# Patient Record
Sex: MALE | Race: WHITE | NOT HISPANIC OR LATINO | Employment: FULL TIME | URBAN - METROPOLITAN AREA
[De-identification: names, ages, dates, MRNs, and addresses within clinical notes are randomized per-mention and may not be internally consistent; named-entity substitution may affect disease eponyms.]

---

## 2022-03-02 ENCOUNTER — HOSPITAL ENCOUNTER (EMERGENCY)
Facility: HOSPITAL | Age: 37
Discharge: HOME | End: 2022-03-02
Attending: EMERGENCY MEDICINE | Admitting: EMERGENCY MEDICINE

## 2022-03-02 VITALS
RESPIRATION RATE: 16 BRPM | HEIGHT: 66 IN | OXYGEN SATURATION: 100 % | DIASTOLIC BLOOD PRESSURE: 92 MMHG | WEIGHT: 165 LBS | SYSTOLIC BLOOD PRESSURE: 143 MMHG | BODY MASS INDEX: 26.52 KG/M2 | TEMPERATURE: 96.9 F | HEART RATE: 87 BPM

## 2022-03-02 DIAGNOSIS — G43.009 MIGRAINE WITHOUT AURA AND WITHOUT STATUS MIGRAINOSUS, NOT INTRACTABLE: Primary | ICD-10-CM

## 2022-03-02 PROCEDURE — 99282 EMERGENCY DEPT VISIT SF MDM: CPT

## 2022-03-02 PROCEDURE — 25010000002 METHYLPREDNISOLONE PER 125 MG: Performed by: NURSE PRACTITIONER

## 2022-03-02 PROCEDURE — 96375 TX/PRO/DX INJ NEW DRUG ADDON: CPT

## 2022-03-02 PROCEDURE — 25010000002 DROPERIDOL PER 5 MG: Performed by: NURSE PRACTITIONER

## 2022-03-02 PROCEDURE — 25010000002 DIPHENHYDRAMINE PER 50 MG: Performed by: NURSE PRACTITIONER

## 2022-03-02 PROCEDURE — 99283 EMERGENCY DEPT VISIT LOW MDM: CPT

## 2022-03-02 PROCEDURE — 96374 THER/PROPH/DIAG INJ IV PUSH: CPT

## 2022-03-02 RX ORDER — DROPERIDOL 2.5 MG/ML
2.5 INJECTION, SOLUTION INTRAMUSCULAR; INTRAVENOUS ONCE
Status: COMPLETED | OUTPATIENT
Start: 2022-03-02 | End: 2022-03-02

## 2022-03-02 RX ORDER — METHYLPREDNISOLONE SODIUM SUCCINATE 125 MG/2ML
125 INJECTION, POWDER, LYOPHILIZED, FOR SOLUTION INTRAMUSCULAR; INTRAVENOUS ONCE
Status: COMPLETED | OUTPATIENT
Start: 2022-03-02 | End: 2022-03-02

## 2022-03-02 RX ORDER — DIPHENHYDRAMINE HYDROCHLORIDE 50 MG/ML
25 INJECTION INTRAMUSCULAR; INTRAVENOUS ONCE
Status: COMPLETED | OUTPATIENT
Start: 2022-03-02 | End: 2022-03-02

## 2022-03-02 RX ORDER — SODIUM CHLORIDE 0.9 % (FLUSH) 0.9 %
10 SYRINGE (ML) INJECTION AS NEEDED
Status: DISCONTINUED | OUTPATIENT
Start: 2022-03-02 | End: 2022-03-02 | Stop reason: HOSPADM

## 2022-03-02 RX ORDER — SUMATRIPTAN 50 MG/1
50 TABLET, FILM COATED ORAL ONCE
Qty: 18 TABLET | Refills: 0 | Status: SHIPPED | OUTPATIENT
Start: 2022-03-02 | End: 2022-06-28 | Stop reason: SDUPTHER

## 2022-03-02 RX ADMIN — METHYLPREDNISOLONE SODIUM SUCCINATE 125 MG: 125 INJECTION, POWDER, FOR SOLUTION INTRAMUSCULAR; INTRAVENOUS at 13:39

## 2022-03-02 RX ADMIN — DROPERIDOL 2.5 MG: 2.5 INJECTION, SOLUTION INTRAMUSCULAR; INTRAVENOUS at 13:38

## 2022-03-02 RX ADMIN — SODIUM CHLORIDE 1000 ML: 9 INJECTION, SOLUTION INTRAVENOUS at 13:25

## 2022-03-02 RX ADMIN — DIPHENHYDRAMINE HYDROCHLORIDE 25 MG: 50 INJECTION, SOLUTION INTRAMUSCULAR; INTRAVENOUS at 13:37

## 2022-03-02 NOTE — ED NOTES
Pt arrives from home via PV with c/o migraine x 6 days. Hx of migraines. Pt a&ox4, abc's intact, NAD noted, ambulatory to triage.    Patient wearing mask during triage. RN wearing appropriate PPE during triage. Hand hygiene performed.        Joann Hyatt, RN  03/02/22 4695

## 2022-03-02 NOTE — DISCHARGE INSTRUCTIONS
Keep a headache journal    Can try Benadryl with Ibuprofen or Tylenol at onset of headache    Return Precautions    Although you are being discharged from the ED today, I encourage you to return for worsening symptoms.  Things can, and do, change such that treatment at home with medication may not be adequate.      Specifically, return for any of the following:    Chest pain, shortness of breath, pain or nausea and vomiting not controlled by medications provided.    Please make a follow up with your Primary Care Provider for a blood pressure recheck.

## 2022-03-02 NOTE — ED PROVIDER NOTES
EMERGENCY DEPARTMENT ENCOUNTER    Room Number:  A01/01  Date seen:  3/2/2022  Time seen: 13:03 EST  PCP: Karson Solorzano Jr., JUDAH  Historian: Patient    HPI:  Chief complaint: Migraine headache  A complete HPI/ROS/PMH/PSH/SH/FH are unobtainable due to: n/a  Context:Harinder Dale is a 36 y.o. male who presents to the ED with c/o 6 days of migraine headache that is in its usual location, behind the left eye and left forehead.  It is described as moderate to severe and he has associated n/v (on Saturday) and photophobia.  It is not made better by multiple dose of Excedrin.  It is made worse by an increased stress level.  He has not been prescribed anything in past for migraines but states he has had them for years.  He denies any unilateral weakness, problems speaking or visual changes.     Patient was placed in face mask in first look. Patient was wearing facemask when I entered the room and throughout our encounter. I wore full protective equipment throughout this patient encounter including a N95 face mask, eye shield and gloves. Hand hygiene/washing of hands was performed before donning protective equipment and after removal when leaving the room.    MEDICAL RECORD REVIEW    ALLERGIES  Patient has no known allergies.    PAST MEDICAL HISTORY  Active Ambulatory Problems     Diagnosis Date Noted   • No Active Ambulatory Problems     Resolved Ambulatory Problems     Diagnosis Date Noted   • No Resolved Ambulatory Problems     No Additional Past Medical History       PAST SURGICAL HISTORY  Past Surgical History:   Procedure Laterality Date   • KNEE FUSION Right        FAMILY HISTORY  History reviewed. No pertinent family history.    SOCIAL HISTORY  Social History     Socioeconomic History   • Marital status: Single   Tobacco Use   • Smoking status: Former Smoker     Quit date: 3/2/2016     Years since quittin.0   • Smokeless tobacco: Never Used   Vaping Use   • Vaping Use: Never used   Substance and Sexual  Activity   • Alcohol use: Not Currently       REVIEW OF SYSTEMS  Review of Systems    All systems reviewed and negative except for those discussed in HPI.     PHYSICAL EXAM    ED Triage Vitals [03/02/22 1251]   Temp Heart Rate Resp BP SpO2   96.9 °F (36.1 °C) 105 18 -- 100 %      Temp src Heart Rate Source Patient Position BP Location FiO2 (%)   Tympanic Monitor -- -- --     Physical Exam    I have reviewed the triage vital signs and nursing notes.      GENERAL: Appears as though he does not feel well, tearful  HENT: nares patent, membranes moist  EYES: no scleral icterus, PERRL, EOMI  NECK: no ROM limitations, no rigidity  CV: regular rhythm, regular rate, no murmur, no rubs, no gallups  RESPIRATORY: normal effort, CTAB  ABDOMEN: soft  : deferred  MUSCULOSKELETAL: no deformity  NEURO: alert, moves all extremities, follows commands  SKIN: warm, dry      PROGRESS, DATA ANALYSIS, CONSULTS AND MEDICAL DECISION MAKING  All labs have been independently reviewed by me.  All radiology studies have been reviewed by me and discussed with radiologist dictating the report.  EKG's independently viewed and interpreted by me unless stated otherwise. Discussion below represents my analysis of pertinent findings related to patient's condition, differential diagnosis, treatment plan and final disposition.     ED Course as of 03/02/22 1521   Wed Mar 02, 2022   1321 DDX: headache, migraine headache [EW]   1426 Pt states headache is still present but reports significant improvement.     MDM:  This headache is typical for patient.  He has no red flag neurological findings.  Headache improved with treatment in ER.  I have encouraged patient to keep a headache journal to determine whether or not stress, foods or other factors are contributing to his headaches.  I have discussed with him that he needs to follow this up with his primary care provider.  I discussed to never again take Excedrin as this is a huge rebound agent.  We did  "discuss a short course of Imitrex and he is agreeable to trying this. [EW]      ED Course User Index  [EW] Scarlett Tamayo APRN        Reviewed pt's history and workup with Dr. Lyn.  After a bedside evaluation, Dr. Lyn agrees with the plan of care.    The patient's history, physical exam, and lab findings were discussed with the physician, who also performed a face to face history and physical exam.  I discussed all results and noted any abnormalities with patient.  Discussed absoute need to recheck abnormalities with their family physician.  I answered any of the patient's questions.  Discussed plan for discharge, as there is no emergent indication for admission.  Pt is agreeable and understands need for follow up and repeat testing.  Pt is aware that discharge does not mean that nothing is wrong but it indicates no emergency is present and they must continue care with their family physician.  Pt is discharged with instructions to follow up with primary care doctor to have their blood pressure rechecked.       Disposition vitals:  /92 (BP Location: Left arm, Patient Position: Lying)   Pulse 87   Temp 96.9 °F (36.1 °C) (Tympanic)   Resp 16   Ht 167.6 cm (66\")   Wt 74.8 kg (165 lb)   SpO2 100%   BMI 26.63 kg/m²       DIAGNOSIS  Final diagnoses:   Migraine without aura and without status migrainosus, not intractable       FOLLOW UP   Karson Solorzano Jr., APRN  [address] [facility] [address] [phone]    Schedule an appointment as soon as possible for a visit   to discuss your headaches         Scarlett Tamayo APRN  03/02/22 1521    "

## 2022-05-08 ENCOUNTER — TELEPHONE (OUTPATIENT)
Dept: NEUROLOGY | Facility: CLINIC | Age: 37
End: 2022-05-08

## 2022-06-28 ENCOUNTER — OFFICE VISIT (OUTPATIENT)
Dept: NEUROLOGY | Facility: CLINIC | Age: 37
End: 2022-06-28

## 2022-06-28 VITALS
HEIGHT: 66 IN | HEART RATE: 86 BPM | OXYGEN SATURATION: 98 % | DIASTOLIC BLOOD PRESSURE: 74 MMHG | BODY MASS INDEX: 28.93 KG/M2 | SYSTOLIC BLOOD PRESSURE: 120 MMHG | WEIGHT: 180 LBS

## 2022-06-28 DIAGNOSIS — G43.009 MIGRAINE WITHOUT AURA AND WITHOUT STATUS MIGRAINOSUS, NOT INTRACTABLE: Primary | ICD-10-CM

## 2022-06-28 PROCEDURE — 99204 OFFICE O/P NEW MOD 45 MIN: CPT | Performed by: PSYCHIATRY & NEUROLOGY

## 2022-06-28 RX ORDER — IBUPROFEN 400 MG/1
400 TABLET ORAL
COMMUNITY

## 2022-06-28 RX ORDER — SUMATRIPTAN 50 MG/1
50 TABLET, FILM COATED ORAL ONCE AS NEEDED
Qty: 9 TABLET | Refills: 2 | Status: SHIPPED | OUTPATIENT
Start: 2022-06-28 | End: 2022-09-30

## 2022-06-28 RX ORDER — SUMATRIPTAN 50 MG/1
50 TABLET, FILM COATED ORAL ONCE AS NEEDED
Qty: 9 TABLET | Refills: 2 | Status: SHIPPED | OUTPATIENT
Start: 2022-06-28 | End: 2022-06-28

## 2022-06-28 NOTE — PROGRESS NOTES
Chief Complaint  Migraine    Subjective          Harinder Dale presents to [facility] NEUROLOGY for   HISTORY OF PRESENT ILLNESS:    Harinder Dale is a 37 year old right handed man who presents to neurology clinic for initial evaluation and treatment of migraines.  He has had migraines since he was 4 or 5 years old.  The headaches are mostly in the front of his head with a throbbing quality which he rates as 10/10 on pain scale 1-10 when most severe with associated light and sound sensitivity along with nausea and vomiting.  Headaches can last 12 hours up to 6 days in duration.  He had the longest migraine in 2022 for which he went to the ED and was prescribed sumatriptan 50 mg which he used and this got rid of her migraine.  He continued to get the migraines for about 2 months and he has been migraine free of there last month.  He has completely cut out caffeine and Excedrin migraine.  There is family history of migraines in his sister.  He had a head CT scan done in 3/2015 which I reviewed the images independently on his visit today and looks normal.  He is also taking ibuprofen as needed.  Overall he is doing well at this time.  Stress and gluten tends to be known triggers for him.  Laying down in a cold and dark room tends to help with his migraines.  He denies any history of kidney stones or heart palpitations.  He does not have any auras with his migraines currently.      Past Medical History:   Diagnosis Date   • Difficulty walking    • Migraine         Family History   Problem Relation Age of Onset   • Migraines Sister         Social History     Socioeconomic History   • Marital status: Single   Tobacco Use   • Smoking status: Former Smoker     Quit date: 3/2/2016     Years since quittin.3   • Smokeless tobacco: Never Used   Vaping Use   • Vaping Use: Never used   Substance and Sexual Activity   • Alcohol use: Not Currently        I have reviewed and confirmed the accuracy  "of the ROS as documented by the MA/LPN/RN Dominic Aponte MD    Review of Systems   Constitutional: Negative for activity change, appetite change, fatigue and fever.   HENT: Negative for trouble swallowing and voice change.    Eyes: Negative for blurred vision, double vision and itching.   Gastrointestinal: Negative for nausea and vomiting.   Musculoskeletal: Positive for back pain and gait problem. Negative for neck stiffness.   Allergic/Immunologic: Positive for food allergies (wheat). Negative for environmental allergies.   Neurological: Positive for headache. Negative for dizziness, tremors, seizures, syncope, facial asymmetry, speech difficulty, weakness, light-headedness, numbness, memory problem and confusion.   Psychiatric/Behavioral: Negative for agitation, behavioral problems, decreased concentration, dysphoric mood, hallucinations, self-injury, sleep disturbance, suicidal ideas, negative for hyperactivity, depressed mood and stress. The patient is not nervous/anxious.         Objective   Vital Signs:   /74   Pulse 86   Ht 167.6 cm (65.98\")   Wt 81.6 kg (180 lb)   SpO2 98%   BMI 29.07 kg/m²       PHYSICAL EXAM:    General   Mental Status - Alert. General Appearance - Well developed, Well groomed, Oriented and Cooperative. Orientation - Oriented X3.       Head and Neck  Head - normocephalic, atraumatic with no lesions or palpable masses.  Neck    Global Assessment - supple.       Eye   Sclera/Conjunctiva - Bilateral - Normal.    ENMT  Mouth and Throat   Oral Cavity/Oropharynx: Oropharynx - the soft palate,uvula and tongue are normal in appearance.    Chest and Lung Exam   Chest - lung clear to auscultation bilaterally.    Cardiovascular   Cardiovascular examination reveals  - normal heart sounds, regular rate and rhythm.    Neurologic   Mental Status: Speech - Normal. Cognitive function - appropriate fund of knowledge. No impairment of attention, Impairment of concentration, impairment of long " term memory or impairment of short term memory.  Cranial Nerves:   II Optic: Visual acuity - Left - Normal. Right - Normal. Visual fields - Normal (to confrontation).  III Oculomotor: Pupillary constriction - Left - Normal. Right - Normal.  VII Facial: - Normal Bilaterally.   IX Glossopharyngeal / X Vagus - Normal.  XI Accessory: Trapezius - Bilateral - Normal. Sternocleidomastoid - Bilateral - Normal.  XII Hypoglossal - Bilateral - Normal.  Eye Movements: - Normal Bilaterally.  Sensory:   Light Touch: Intact - Globally.  Motor:   Bulk and Contour: - Normal.  Tone: - Normal.  Tremor: Not present.  Strength: 5/5 normal muscle strength - All Muscles.   General Assessment of Reflexes: - deep tendon reflexes are normal. Coordination - No Impairment of finger-to-nose or Impairment of rapid alternating movements. Gait - Broad based, right knee if fused, uses it as crutch when walking.     Result Review :                 Assessment and Plan    Problem List Items Addressed This Visit        Neuro    Migraine without aura and without status migrainosus, not intractable - Primary    Current Assessment & Plan     37 year old right handed man migraines without aura.  He has had migraines since he was 4 or 5 years old.  The headaches are mostly in the front of his head with a throbbing quality which he rates as 10/10 on pain scale 1-10 when most severe with associated light and sound sensitivity along with nausea and vomiting.  Headaches can last 12 hours up to 6 days in duration.  He had the longest migraine in March 2022 for which he went to the ED and was prescribed sumatriptan 50 mg which he used and this got rid of her migraine.  He continued to get the migraines for about 2 months and he has been migraine free of there last month.  He has completely cut out caffeine and Excedrin migraine.  There is family history of migraines in his sister.  He had a head CT scan done in 3/2015 which I reviewed the images independently on  his visit today and looks normal.  He is also taking ibuprofen as needed.  Overall he is doing well at this time.  Stress and gluten tends to be known triggers for him.  Laying down in a cold and dark room tends to help with his migraines.  He denies any history of kidney stones or heart palpitations.  He does not have any auras with his migraines currently.  Discussed treatment of his migraines, at this time as he is doing well we will not start him on migraine preventative medicine and instead I will prescribe him the sumatriptan which he can use acutely for migraine treatment as needed.  Discussed migraine triggers and lifestyle modifications at length and provided patient education information on his visit today.              Relevant Medications    ibuprofen (ADVIL,MOTRIN) 400 MG tablet    SUMAtriptan (IMITREX) 50 MG tablet          I spent time caring for Harinder on this date of service. This time includes time spent by me in the following activities:preparing for the visit, reviewing tests, obtaining and/or reviewing a separately obtained history, performing a medically appropriate examination and/or evaluation , counseling and educating the patient/family/caregiver, ordering medications, tests, or procedures, documenting information in the medical record, independently interpreting results and communicating that information with the patient/family/caregiver and care coordination    Follow Up   Return in about 3 months (around 9/28/2022).  Patient was given instructions and counseling regarding his condition or for health maintenance advice. Please see specific information pulled into the AVS if appropriate.

## 2022-06-28 NOTE — ASSESSMENT & PLAN NOTE
37 year old right handed man migraines without aura.  He has had migraines since he was 4 or 5 years old.  The headaches are mostly in the front of his head with a throbbing quality which he rates as 10/10 on pain scale 1-10 when most severe with associated light and sound sensitivity along with nausea and vomiting.  Headaches can last 12 hours up to 6 days in duration.  He had the longest migraine in March 2022 for which he went to the ED and was prescribed sumatriptan 50 mg which he used and this got rid of her migraine.  He continued to get the migraines for about 2 months and he has been migraine free of there last month.  He has completely cut out caffeine and Excedrin migraine.  There is family history of migraines in his sister.  He had a head CT scan done in 3/2015 which I reviewed the images independently on his visit today and looks normal.  He is also taking ibuprofen as needed.  Overall he is doing well at this time.  Stress and gluten tends to be known triggers for him.  Laying down in a cold and dark room tends to help with his migraines.  He denies any history of kidney stones or heart palpitations.  He does not have any auras with his migraines currently.  Discussed treatment of his migraines, at this time as he is doing well we will not start him on migraine preventative medicine and instead I will prescribe him the sumatriptan which he can use acutely for migraine treatment as needed.  Discussed migraine triggers and lifestyle modifications at length and provided patient education information on his visit today.

## 2022-07-07 ENCOUNTER — PATIENT ROUNDING (BHMG ONLY) (OUTPATIENT)
Dept: NEUROLOGY | Facility: CLINIC | Age: 37
End: 2022-07-07

## 2022-09-30 ENCOUNTER — OFFICE VISIT (OUTPATIENT)
Dept: NEUROLOGY | Facility: CLINIC | Age: 37
End: 2022-09-30

## 2022-09-30 VITALS
WEIGHT: 173 LBS | OXYGEN SATURATION: 98 % | HEIGHT: 66 IN | BODY MASS INDEX: 27.8 KG/M2 | SYSTOLIC BLOOD PRESSURE: 124 MMHG | DIASTOLIC BLOOD PRESSURE: 72 MMHG | HEART RATE: 73 BPM

## 2022-09-30 DIAGNOSIS — G43.009 MIGRAINE WITHOUT AURA AND WITHOUT STATUS MIGRAINOSUS, NOT INTRACTABLE: Primary | ICD-10-CM

## 2022-09-30 PROCEDURE — 99214 OFFICE O/P EST MOD 30 MIN: CPT | Performed by: PSYCHIATRY & NEUROLOGY

## 2022-09-30 RX ORDER — RIZATRIPTAN BENZOATE 5 MG/1
5 TABLET ORAL ONCE AS NEEDED
Qty: 12 TABLET | Refills: 2 | Status: SHIPPED | OUTPATIENT
Start: 2022-09-30 | End: 2023-01-26

## 2022-09-30 NOTE — PROGRESS NOTES
Chief Complaint  Migraine (Pt here today for follow up on migraines. Pt is taking Imitrex for abortive. Stated his migraines are better since the last time he was seen. /)    Subjective          Harinder Dale presents to [facility] NEUROLOGY for   HISTORY OF PRESENT ILLNESS:    Harinder Dale is a 37 year old right handed man who returns to neurology clinic for follow up evaluation and treatment of migraines and some side effect from the sumatriptan.  He has had migraines since he was 4 or 5 years old.  The headaches are mostly in the front of his head with a throbbing quality which he rates as 10/10 on pain scale 1-10 when most severe with associated light and sound sensitivity along with nausea and vomiting.  Headaches can last 12 hours up to 6 days in duration.  He had the longest migraine in March 2022 for which he went to the ED and was prescribed sumatriptan 50 mg which he used and this got rid of her migraine.  He continued to get the migraines at times once a week and at times much less often every couple months.  He has completely cut out caffeine and Excedrin migraine.  There is family history of migraines in his sister.  He had a head CT scan done in 3/2015 which looks normal.  Stress and gluten tends to be known triggers for him.  Laying down in a cold and dark room tends to help with his migraines.  He denies any history of kidney stones or heart palpitations.  He does not have any auras with his migraines currently.  I started him on sumatriptan which does help with the migraines but he has noticed some side effects of feeling dizzy and foggy when he takes this medicine.      Past Medical History:   Diagnosis Date   • Difficulty walking    • Migraine         Family History   Problem Relation Age of Onset   • Migraines Sister         Social History     Socioeconomic History   • Marital status: Single   Tobacco Use   • Smoking status: Former Smoker     Quit date: 3/2/2016     Years  "since quittin.5   • Smokeless tobacco: Never Used   Vaping Use   • Vaping Use: Never used   Substance and Sexual Activity   • Alcohol use: Not Currently        I have reviewed and confirmed the accuracy of the ROS as documented by the MA/LPN/RN Dominic Aponte MD    Review of Systems   Musculoskeletal: Negative for back pain, gait problem and neck pain.   Neurological: Positive for dizziness and headache. Negative for tremors, seizures, syncope, facial asymmetry, speech difficulty, weakness, light-headedness, numbness, memory problem and confusion.   Hematological: Negative for adenopathy. Does not bruise/bleed easily.   Psychiatric/Behavioral: Negative for agitation, behavioral problems, decreased concentration, dysphoric mood, hallucinations, self-injury, sleep disturbance, suicidal ideas, negative for hyperactivity, depressed mood and stress. The patient is not nervous/anxious.         Objective   Vital Signs:   /72   Pulse 73   Ht 167.6 cm (65.98\")   Wt 78.5 kg (173 lb)   SpO2 98%   BMI 27.94 kg/m²       PHYSICAL EXAM:    General   Mental Status - Alert. General Appearance - Well developed, Well groomed, Oriented and Cooperative. Orientation - Oriented X3.       Head and Neck  Head - normocephalic, atraumatic with no lesions or palpable masses.  Neck    Global Assessment - supple.       Eye   Sclera/Conjunctiva - Bilateral - Normal.    ENMT  Mouth and Throat   Oral Cavity/Oropharynx: Oropharynx - the soft palate,uvula and tongue are normal in appearance.    Chest and Lung Exam   Chest - lung clear to auscultation bilaterally.    Cardiovascular   Cardiovascular examination reveals  - normal heart sounds, regular rate and rhythm.    Neurologic   Mental Status: Speech - Normal. Cognitive function - appropriate fund of knowledge. No impairment of attention, Impairment of concentration, impairment of long term memory or impairment of short term memory.  Cranial Nerves:   II Optic: Visual acuity - " Left - Normal. Right - Normal. Visual fields - Normal (to confrontation).  III Oculomotor: Pupillary constriction - Left - Normal. Right - Normal.  VII Facial: - Normal Bilaterally.   IX Glossopharyngeal / X Vagus - Normal.  XI Accessory: Trapezius - Bilateral - Normal. Sternocleidomastoid - Bilateral - Normal.  XII Hypoglossal - Bilateral - Normal.  Eye Movements: - Normal Bilaterally.  Sensory:   Light Touch: Intact - Globally.  Motor:   Bulk and Contour: - Normal.  Tone: - Normal.  Tremor: Not present.  Strength: 5/5 normal muscle strength - All Muscles.                                                        General Assessment of Reflexes: - deep tendon reflexes are normal. Coordination - No Impairment of finger-to-nose or Impairment of rapid alternating movements. Gait - Broad based, right knee if fused, uses it as crutch when walking.     Result Review :                 Assessment and Plan    Problem List Items Addressed This Visit        Neuro    Migraine without aura and without status migrainosus, not intractable - Primary    Current Assessment & Plan     37 year old right handed man with episodic migraines without aura who has had some side effects from sumatriptan.  He has had migraines since he was 4 or 5 years old.  The headaches are mostly in the front of his head with a throbbing quality which he rates as 10/10 on pain scale 1-10 when most severe with associated light and sound sensitivity along with nausea and vomiting.  Headaches can last 12 hours up to 6 days in duration.  He had the longest migraine in March 2022 for which he went to the ED and was prescribed sumatriptan 50 mg which he used and this got rid of her migraine.  He continued to get the migraines at times once a week and at times much less often every couple months.  He has completely cut out caffeine and Excedrin migraine.  There is family history of migraines in his sister.  He had a head CT scan done in 3/2015 which looks normal.   Stress and gluten tends to be known triggers for him.  Laying down in a cold and dark room tends to help with his migraines.  He denies any history of kidney stones or heart palpitations.  He does not have any auras with his migraines currently.  I started him on sumatriptan which does help with the migraines but he has noticed some side effects of feeling dizzy and foggy when he takes this medicine.  I will switch him from the sumatriptan which seems to be causing some side effects to rizatriptan to see if this medicine is better tolerated.  Discussed migraine triggers and lifestyle modifications as well.                 Relevant Medications    rizatriptan (MAXALT) 5 MG tablet          Follow Up   Return in about 3 months (around 12/30/2022).  Patient was given instructions and counseling regarding his condition or for health maintenance advice. Please see specific information pulled into the AVS if appropriate.

## 2022-09-30 NOTE — ASSESSMENT & PLAN NOTE
37 year old right handed man with episodic migraines without aura who has had some side effects from sumatriptan.  He has had migraines since he was 4 or 5 years old.  The headaches are mostly in the front of his head with a throbbing quality which he rates as 10/10 on pain scale 1-10 when most severe with associated light and sound sensitivity along with nausea and vomiting.  Headaches can last 12 hours up to 6 days in duration.  He had the longest migraine in March 2022 for which he went to the ED and was prescribed sumatriptan 50 mg which he used and this got rid of her migraine.  He continued to get the migraines at times once a week and at times much less often every couple months.  He has completely cut out caffeine and Excedrin migraine.  There is family history of migraines in his sister.  He had a head CT scan done in 3/2015 which looks normal.  Stress and gluten tends to be known triggers for him.  Laying down in a cold and dark room tends to help with his migraines.  He denies any history of kidney stones or heart palpitations.  He does not have any auras with his migraines currently.  I started him on sumatriptan which does help with the migraines but he has noticed some side effects of feeling dizzy and foggy when he takes this medicine.  I will switch him from the sumatriptan which seems to be causing some side effects to rizatriptan to see if this medicine is better tolerated.  Discussed migraine triggers and lifestyle modifications as well.

## 2023-01-26 ENCOUNTER — OFFICE VISIT (OUTPATIENT)
Dept: NEUROLOGY | Facility: CLINIC | Age: 38
End: 2023-01-26
Payer: COMMERCIAL

## 2023-01-26 VITALS
DIASTOLIC BLOOD PRESSURE: 68 MMHG | HEIGHT: 66 IN | BODY MASS INDEX: 27.97 KG/M2 | OXYGEN SATURATION: 98 % | SYSTOLIC BLOOD PRESSURE: 124 MMHG | HEART RATE: 74 BPM | WEIGHT: 174 LBS

## 2023-01-26 DIAGNOSIS — G43.009 MIGRAINE WITHOUT AURA AND WITHOUT STATUS MIGRAINOSUS, NOT INTRACTABLE: Primary | ICD-10-CM

## 2023-01-26 PROCEDURE — 99213 OFFICE O/P EST LOW 20 MIN: CPT | Performed by: PSYCHIATRY & NEUROLOGY

## 2023-01-26 RX ORDER — SUMATRIPTAN 50 MG/1
50 TABLET, FILM COATED ORAL ONCE AS NEEDED
Qty: 9 TABLET | Refills: 2 | Status: SHIPPED | OUTPATIENT
Start: 2023-01-26

## 2023-01-26 NOTE — ASSESSMENT & PLAN NOTE
37 year old right handed man with episodic migraines.  He has had migraines since he was 4 or 5 years old.  The headaches are mostly in the front of his head with a throbbing quality which he rates as 10/10 on pain scale 1-10 when most severe with associated light and sound sensitivity along with nausea and vomiting.  Headaches can last 12 hours up to 6 days in duration.  He had the longest migraine in March 2022 for which he went to the ED and was prescribed sumatriptan 50 mg which he used and this got rid of his migraine.  He continued to get the migraines at times once a week and at times much less often every couple months.  He has completely cut out caffeine and Excedrin migraine.  There is family history of migraines in his sister.  He had a head CT scan done in 3/2015 which looks normal.  Stress and gluten tends to be known triggers for him.  Laying down in a cold and dark room tends to help with his migraines.  He denies any history of kidney stones or heart palpitations.  He does not have any auras with his migraines currently.  I started him on sumatriptan which does help with the migraines and he thinks the rizatriptan did not help with his migraine unlike the sumatriptan.  He has rare migraines at this time and has only had 1 since his last visit.  I will switch him back to sumatriptan today which was working better for him.  Will follow up in 6 months and sooner if needed.

## 2023-01-26 NOTE — PROGRESS NOTES
Chief Complaint  Migraine (Pt is here for follow up for migraines. He states they are much better. /)    Subjective          Harinder Dale presents to [facility]UR[facility] for   HISTORY OF PRESENT ILLNESS:    Harinder Dale is a 37 year old right handed man who returns to neurology clinic for follow up evaluation and treatment of episodic migraines.  He has had migraines since he was 4 or 5 years old.  The headaches are mostly in the front of his head with a throbbing quality which he rates as 10/10 on pain scale 1-10 when most severe with associated light and sound sensitivity along with nausea and vomiting.  Headaches can last 12 hours up to 6 days in duration.  He had the longest migraine in March 2022 for which he went to the ED and was prescribed sumatriptan 50 mg which he used and this got rid of his migraine.  He continued to get the migraines at times once a week and at times much less often every couple months.  He has completely cut out caffeine and Excedrin migraine.  There is family history of migraines in his sister.  He had a head CT scan done in 3/2015 which looks normal.  Stress and gluten tends to be known triggers for him.  Laying down in a cold and dark room tends to help with his migraines.  He denies any history of kidney stones or heart palpitations.  He does not have any auras with his migraines currently.  I started him on sumatriptan which does help with the migraines and he thinks the rizatriptan did not help with his migraine unlike the sumatriptan.  He has rare migraines at this time and has only had 1 since his last visit.      Past Medical History:   Diagnosis Date   • Difficulty walking    • Migraine         Family History   Problem Relation Age of Onset   • Migraines Sister         Social History     Socioeconomic History   • Marital status: Single   Tobacco Use   • Smoking status: Former     Types: Cigarettes     Quit date: 3/2/2016     Years since quitting:  "6.9   • Smokeless tobacco: Never   Vaping Use   • Vaping Use: Never used   Substance and Sexual Activity   • Alcohol use: Not Currently        I have reviewed and confirmed the accuracy of the ROS as documented by the MA/LPN/RN Dominic Aponte MD    Review of Systems   Musculoskeletal: Negative for back pain, gait problem and neck pain.   Neurological: Positive for headache. Negative for dizziness, tremors, seizures, syncope, facial asymmetry, speech difficulty, weakness, light-headedness, numbness, memory problem and confusion.   Psychiatric/Behavioral: Negative for agitation, behavioral problems, decreased concentration, dysphoric mood, hallucinations, self-injury, sleep disturbance, suicidal ideas, negative for hyperactivity, depressed mood and stress. The patient is not nervous/anxious.         Objective   Vital Signs:   /68   Pulse 74   Ht 167.6 cm (65.98\")   Wt 78.9 kg (174 lb)   SpO2 98%   BMI 28.10 kg/m²       PHYSICAL EXAM:    General   Mental Status - Alert. General Appearance - Well developed, Well groomed, Oriented and Cooperative. Orientation - Oriented X3.       Head and Neck  Head - normocephalic, atraumatic with no lesions or palpable masses.  Neck    Global Assessment - supple.       Eye   Sclera/Conjunctiva - Bilateral - Normal.    ENMT  Mouth and Throat   Oral Cavity/Oropharynx: Oropharynx - the soft palate,uvula and tongue are normal in appearance.    Chest and Lung Exam   Chest - lung clear to auscultation bilaterally.    Cardiovascular   Cardiovascular examination reveals  - normal heart sounds, regular rate and rhythm.    Neurologic   Mental Status: Speech - Normal. Cognitive function - appropriate fund of knowledge. No impairment of attention, Impairment of concentration, impairment of long term memory or impairment of short term memory.  Cranial Nerves:   II Optic: Visual acuity - Left - Normal. Right - Normal. Visual fields - Normal (to confrontation).  III Oculomotor: Pupillary " constriction - Left - Normal. Right - Normal.  VII Facial: - Normal Bilaterally.   IX Glossopharyngeal / X Vagus - Normal.  XI Accessory: Trapezius - Bilateral - Normal. Sternocleidomastoid - Bilateral - Normal.  XII Hypoglossal - Bilateral - Normal.  Eye Movements: - Normal Bilaterally.  Sensory:   Light Touch: Intact - Globally.  Motor:   Bulk and Contour: - Normal.  Tone: - Normal.  Tremor: Not present.  Strength: 5/5 normal muscle strength - All Muscles.                                                        General Assessment of Reflexes: - deep tendon reflexes are normal. Coordination - No Impairment of finger-to-nose or Impairment of rapid alternating movements. Gait - Broad based, right knee if fused, uses it as crutch when walking.    Result Review :                 Assessment and Plan    Problem List Items Addressed This Visit        Neuro    Migraine without aura and without status migrainosus, not intractable - Primary    Current Assessment & Plan     37 year old right handed man with episodic migraines.  He has had migraines since he was 4 or 5 years old.  The headaches are mostly in the front of his head with a throbbing quality which he rates as 10/10 on pain scale 1-10 when most severe with associated light and sound sensitivity along with nausea and vomiting.  Headaches can last 12 hours up to 6 days in duration.  He had the longest migraine in March 2022 for which he went to the ED and was prescribed sumatriptan 50 mg which he used and this got rid of his migraine.  He continued to get the migraines at times once a week and at times much less often every couple months.  He has completely cut out caffeine and Excedrin migraine.  There is family history of migraines in his sister.  He had a head CT scan done in 3/2015 which looks normal.  Stress and gluten tends to be known triggers for him.  Laying down in a cold and dark room tends to help with his migraines.  He denies any history of kidney stones  or heart palpitations.  He does not have any auras with his migraines currently.  I started him on sumatriptan which does help with the migraines and he thinks the rizatriptan did not help with his migraine unlike the sumatriptan.  He has rare migraines at this time and has only had 1 since his last visit.  I will switch him back to sumatriptan today which was working better for him.  Will follow up in 6 months and sooner if needed.            Relevant Medications    SUMAtriptan (Imitrex) 50 MG tablet       Follow Up   Return in about 6 months (around 7/26/2023).  Patient was given instructions and counseling regarding his condition or for health maintenance advice. Please see specific information pulled into the AVS if appropriate.

## 2023-07-28 ENCOUNTER — OFFICE VISIT (OUTPATIENT)
Dept: NEUROLOGY | Facility: CLINIC | Age: 38
End: 2023-07-28
Payer: COMMERCIAL

## 2023-07-28 VITALS
WEIGHT: 156 LBS | BODY MASS INDEX: 25.19 KG/M2 | HEART RATE: 85 BPM | OXYGEN SATURATION: 98 % | DIASTOLIC BLOOD PRESSURE: 80 MMHG | SYSTOLIC BLOOD PRESSURE: 122 MMHG

## 2023-07-28 DIAGNOSIS — G43.009 MIGRAINE WITHOUT AURA AND WITHOUT STATUS MIGRAINOSUS, NOT INTRACTABLE: Primary | ICD-10-CM

## 2023-07-28 PROCEDURE — 99213 OFFICE O/P EST LOW 20 MIN: CPT | Performed by: PSYCHIATRY & NEUROLOGY

## 2023-07-28 RX ORDER — SUMATRIPTAN 50 MG/1
50 TABLET, FILM COATED ORAL ONCE AS NEEDED
Qty: 9 TABLET | Refills: 2 | Status: SHIPPED | OUTPATIENT
Start: 2023-07-28

## 2023-07-28 NOTE — PROGRESS NOTES
Chief Complaint  Migraine    Subjective          Harinder Dale presents to [facility]UROLOG[facility] for   HISTORY OF PRESENT ILLNESS:    Harinder Dale is a 38 year old right handed man who returns to neurology clinic for follow up evaluation and treatment of episodic migraines.  He has had migraines since he was 4 or 5 years old.  The headaches are mostly in the front of his head with a throbbing quality which he rates as 10/10 on pain scale 1-10 when most severe with associated light and sound sensitivity along with nausea and vomiting.  Headaches can last 12 hours up to 6 days in duration.  He had the longest migraine in 2022 for which he went to the ED and was prescribed sumatriptan 50 mg which he used and this got rid of his migraine.  He continued to get the migraines at times once a week and at times much less often every couple months.  He has completely cut out caffeine and Excedrin migraine.  There is family history of migraines in his sister.  He had a head CT scan done in 3/2015 which looks normal.  Stress and gluten tends to be known triggers for him.  Laying down in a cold and dark room tends to help with his migraines.  He denies any history of kidney stones or heart palpitations.  He does not have any auras with his migraines currently.  I started him on sumatriptan which does help with the migraines and he thinks the rizatriptan did not help with his migraine unlike the sumatriptan.  He has rare migraines at this time and has only had 1 since his last visit.       Past Medical History:   Diagnosis Date    Difficulty walking     Migraine         Family History   Problem Relation Age of Onset    Migraines Sister         Social History     Socioeconomic History    Marital status: Single   Tobacco Use    Smoking status: Former     Types: Cigarettes     Quit date: 3/2/2016     Years since quittin.4    Smokeless tobacco: Never   Vaping Use    Vaping Use: Never used   Substance  and Sexual Activity    Alcohol use: Not Currently        I have reviewed and confirmed the accuracy of the ROS as documented by the MA/LPN/RN Dominic Aponte MD   Review of Systems   Constitutional:  Negative for activity change, appetite change and fatigue.   Gastrointestinal:  Negative for nausea and vomiting.   Neurological:  Positive for light-headedness and headache. Negative for dizziness, tremors, seizures, syncope, facial asymmetry, speech difficulty, weakness, numbness, memory problem and confusion.   Psychiatric/Behavioral:  Negative for agitation, behavioral problems, decreased concentration, dysphoric mood, hallucinations, self-injury, sleep disturbance, suicidal ideas, negative for hyperactivity, depressed mood and stress. The patient is not nervous/anxious.       Objective   Vital Signs:   /80   Pulse 85   Wt 70.8 kg (156 lb)   SpO2 98%   BMI 25.19 kg/m²       PHYSICAL EXAM:    General   Mental Status - Alert. General Appearance - Well developed, Well groomed, Oriented and Cooperative. Orientation - Oriented X3.       Head and Neck  Head - normocephalic, atraumatic with no lesions or palpable masses.  Neck    Global Assessment - supple.       Eye   Sclera/Conjunctiva - Bilateral - Normal.    ENMT  Mouth and Throat   Oral Cavity/Oropharynx: Oropharynx - the soft palate,uvula and tongue are normal in appearance.    Chest and Lung Exam   Chest - lung clear to auscultation bilaterally.    Cardiovascular   Cardiovascular examination reveals  - normal heart sounds, regular rate and rhythm.    Neurologic   Mental Status: Speech - Normal. Cognitive function - appropriate fund of knowledge. No impairment of attention, Impairment of concentration, impairment of long term memory or impairment of short term memory.  Cranial Nerves:   II Optic: Visual acuity - Left - Normal. Right - Normal. Visual fields - Normal (to confrontation).  III Oculomotor: Pupillary constriction - Left - Normal. Right -  Normal.  VII Facial: - Normal Bilaterally.   IX Glossopharyngeal / X Vagus - Normal.  XI Accessory: Trapezius - Bilateral - Normal. Sternocleidomastoid - Bilateral - Normal.  XII Hypoglossal - Bilateral - Normal.  Eye Movements: - Normal Bilaterally.  Sensory:   Light Touch: Intact - Globally.  Motor:   Bulk and Contour: - Normal.  Tone: - Normal.  Tremor: Not present.  Strength: 5/5 normal muscle strength - All Muscles.                                                        General Assessment of Reflexes: - deep tendon reflexes are normal. Coordination - No Impairment of finger-to-nose or Impairment of rapid alternating movements. Gait - Broad based, right knee is fused, uses it as crutch when walking.        Result Review :                 Assessment and Plan    Problem List Items Addressed This Visit          Neuro    Migraine without aura and without status migrainosus, not intractable - Primary    Current Assessment & Plan     38 year old right handed man with episodic migraines.  He has had migraines since he was 4 or 5 years old.  The headaches are mostly in the front of his head with a throbbing quality which he rates as 10/10 on pain scale 1-10 when most severe with associated light and sound sensitivity along with nausea and vomiting.  Headaches can last 12 hours up to 6 days in duration.  He had the longest migraine in March 2022 for which he went to the ED and was prescribed sumatriptan 50 mg which he used and this got rid of his migraine.  He continued to get the migraines at times once a week and at times much less often every couple months.  He has completely cut out caffeine and Excedrin migraine.  There is family history of migraines in his sister.  He had a head CT scan done in 3/2015 which looks normal.  Stress and gluten tends to be known triggers for him.  Laying down in a cold and dark room tends to help with his migraines.  He denies any history of kidney stones or heart palpitations.  He does  not have any auras with his migraines currently.  I started him on sumatriptan which does help with the migraines and he thinks the rizatriptan did not help with his migraine unlike the sumatriptan.  He has rare migraines at this time and has only had 1 since his last visit. I will continue the sumatriptan as needed for his migraines and will see him back in 6 months and sooner if needed.          Relevant Medications    SUMAtriptan (Imitrex) 50 MG tablet       Follow Up   Return in about 6 months (around 1/28/2024).  Patient was given instructions and counseling regarding his condition or for health maintenance advice. Please see specific information pulled into the AVS if appropriate.

## 2023-07-28 NOTE — ASSESSMENT & PLAN NOTE
38 year old right handed man with episodic migraines.  He has had migraines since he was 4 or 5 years old.  The headaches are mostly in the front of his head with a throbbing quality which he rates as 10/10 on pain scale 1-10 when most severe with associated light and sound sensitivity along with nausea and vomiting.  Headaches can last 12 hours up to 6 days in duration.  He had the longest migraine in March 2022 for which he went to the ED and was prescribed sumatriptan 50 mg which he used and this got rid of his migraine.  He continued to get the migraines at times once a week and at times much less often every couple months.  He has completely cut out caffeine and Excedrin migraine.  There is family history of migraines in his sister.  He had a head CT scan done in 3/2015 which looks normal.  Stress and gluten tends to be known triggers for him.  Laying down in a cold and dark room tends to help with his migraines.  He denies any history of kidney stones or heart palpitations.  He does not have any auras with his migraines currently.  I started him on sumatriptan which does help with the migraines and he thinks the rizatriptan did not help with his migraine unlike the sumatriptan.  He has rare migraines at this time and has only had 1 since his last visit. I will continue the sumatriptan as needed for his migraines and will see him back in 6 months and sooner if needed.

## 2023-12-22 ENCOUNTER — TELEPHONE (OUTPATIENT)
Dept: NEUROLOGY | Facility: CLINIC | Age: 38
End: 2023-12-22
Payer: COMMERCIAL

## 2023-12-22 ENCOUNTER — CLINICAL SUPPORT (OUTPATIENT)
Dept: NEUROLOGY | Facility: CLINIC | Age: 38
End: 2023-12-22
Payer: COMMERCIAL

## 2023-12-22 DIAGNOSIS — G43.009 MIGRAINE WITHOUT AURA AND WITHOUT STATUS MIGRAINOSUS, NOT INTRACTABLE: Primary | ICD-10-CM

## 2023-12-22 PROCEDURE — 96372 THER/PROPH/DIAG INJ SC/IM: CPT | Performed by: PSYCHIATRY & NEUROLOGY

## 2023-12-22 RX ORDER — KETOROLAC TROMETHAMINE 30 MG/ML
30 INJECTION, SOLUTION INTRAMUSCULAR; INTRAVENOUS EVERY 6 HOURS PRN
Status: SHIPPED | OUTPATIENT
Start: 2023-12-22 | End: 2023-12-27

## 2023-12-22 RX ORDER — KETOROLAC TROMETHAMINE 30 MG/ML
30 INJECTION, SOLUTION INTRAMUSCULAR; INTRAVENOUS EVERY 6 HOURS PRN
Status: DISCONTINUED | OUTPATIENT
Start: 2023-12-22 | End: 2023-12-22

## 2023-12-22 RX ADMIN — KETOROLAC TROMETHAMINE 30 MG: 30 INJECTION, SOLUTION INTRAMUSCULAR; INTRAVENOUS at 13:43

## 2023-12-22 NOTE — TELEPHONE ENCOUNTER
"ORTHOPEDIC LOWER EXTREMITY PROGRESS NOTE    POD#1  Patient is a 70 year old female who underwent Procedure(s):  left total knee arthroplasty on 2021 on left . Pain is well controlled. No complaints this morning.  Son will be staying with her after discharge.    Vitals:   Blood pressure (!) 147/80, pulse 61, temperature 98  F (36.7  C), temperature source Oral, resp. rate 16, height 1.638 m (5' 4.5\"), weight 78.7 kg (173 lb 6.4 oz), SpO2 97 %.  Temp (24hrs), Av.8  F (36.6  C), Min:96.8  F (36  C), Max:98.8  F (37.1  C)        Drains: none    EXAM   The patient is alert description: awake and alert.  Calves are soft and non-tender.   Sensation is intact.  Dorsiflexion and plantar flexion is intact.  Foot warm with nl cap refill.  The incision is incision: covered.     Labs:   Recent Labs   Lab Test 21  0804 21  0914 20  0654   HGB 10.7* 13.2 11.2*       ASSESSMENT  S/p L TKA   PLAN  1. DVT prophylaxis: anticoagulants: aspirin  2. Weight Bearing: weightbearing: WBAT (Weight bearing as tolerated).  3. Anticipated discharge date today . Discharge to discharge destination: Home with Outpatient Treatment.  4. Cont Pain Control pain medication: Oxycodone, Tylenol and Celebrex    Sarahi Tse PA-C  [facility]        " PATIENT REPORTING HEADACHE THE LAST 7 DAYS.  IS THERE ANYTHING THAT CAN BE DONE FOR RELIEF?

## 2023-12-22 NOTE — TELEPHONE ENCOUNTER
Spoke with patient and scheduled follow up on Wednesday, patient will call back about Toradol injection for today.

## 2023-12-27 ENCOUNTER — OFFICE VISIT (OUTPATIENT)
Dept: NEUROLOGY | Facility: CLINIC | Age: 38
End: 2023-12-27
Payer: COMMERCIAL

## 2023-12-27 ENCOUNTER — SPECIALTY PHARMACY (OUTPATIENT)
Dept: NEUROLOGY | Facility: CLINIC | Age: 38
End: 2023-12-27
Payer: COMMERCIAL

## 2023-12-27 VITALS
BODY MASS INDEX: 25.19 KG/M2 | OXYGEN SATURATION: 97 % | HEART RATE: 70 BPM | SYSTOLIC BLOOD PRESSURE: 114 MMHG | HEIGHT: 66 IN | DIASTOLIC BLOOD PRESSURE: 72 MMHG

## 2023-12-27 DIAGNOSIS — G43.001 MIGRAINE WITHOUT AURA AND WITH STATUS MIGRAINOSUS, NOT INTRACTABLE: Primary | ICD-10-CM

## 2023-12-27 PROCEDURE — 99214 OFFICE O/P EST MOD 30 MIN: CPT | Performed by: PSYCHIATRY & NEUROLOGY

## 2023-12-27 RX ORDER — RIMEGEPANT SULFATE 75 MG/75MG
75 TABLET, ORALLY DISINTEGRATING ORAL ONCE AS NEEDED
Qty: 4 TABLET | Refills: 0 | COMMUNITY
Start: 2023-12-27 | End: 2023-12-27

## 2023-12-27 RX ORDER — RIMEGEPANT SULFATE 75 MG/75MG
75 TABLET, ORALLY DISINTEGRATING ORAL EVERY OTHER DAY
Qty: 16 TABLET | Refills: 2 | Status: SHIPPED | OUTPATIENT
Start: 2023-12-27

## 2023-12-27 NOTE — PROGRESS NOTES
Specialty Pharmacy Patient Management Program  Neurology Initial Assessment     Harinder Dale is a 38 y.o. male with chronic migraine seen by a Neurology provider and enrolled in the Neurology Patient Management program offered by [facility].  An initial outreach was conducted, including assessment of therapy appropriateness and specialty medication education for rimegepant (Nurtec). The patient was introduced to services offered by [facility], including: regular assessments, refill coordination, curbside pick-up or mail order delivery options, prior authorization maintenance, and financial assistance programs as applicable. The patient was also provided with contact information for the pharmacy team.     Insurance Coverage & Financial Support  Rx [employer] [employer] [place]    Relevant Past Medical History and Comorbidities  Relevant medical history and concomitant health conditions were discussed with the patient. The patient's chart has been reviewed for relevant past medical history and comorbid health conditions and updated as necessary.   Past Medical History:   Diagnosis Date    Difficulty walking     Migraine      Social History     Socioeconomic History    Marital status: Single   Tobacco Use    Smoking status: Former     Types: Cigarettes     Quit date: 3/2/2016     Years since quittin.8    Smokeless tobacco: Never   Vaping Use    Vaping Use: Never used   Substance and Sexual Activity    Alcohol use: Not Currently     Problem list reviewed by Jluis Blunt RPH on 2023 at  1:09 PM      Allergies  Known allergies and reactions were discussed with the patient. The patient's chart has been reviewed for  allergy information and updated as necessary.   No Known Allergies  Allergies reviewed by Jluis Blunt RPH on 2023 at  1:08 PM      Lab Assessment  Labs have been reviewed. No dose adjustments are needed for the specialty  medication(s) based on the labs.       Current Medication List  This medication list has been reviewed with the patient and evaluated for any interactions or necessary modifications/recommendations, and updated to include all prescription medications, OTC medications, and supplements the patient is currently taking.  This list reflects what is contained in the patient's profile, which has also been marked as reviewed to communicate to other providers it is the most up to date version of the patient's current medication therapy.     Current Outpatient Medications:     ibuprofen (ADVIL,MOTRIN) 400 MG tablet, Take 1 tablet by mouth., Disp: , Rfl:     Rimegepant Sulfate (Nurtec) 75 MG tablet dispersible tablet, Take 1 tablet by mouth Every Other Day., Disp: 16 tablet, Rfl: 2    SUMAtriptan (Imitrex) 50 MG tablet, Take 1 tablet by mouth 1 (One) Time As Needed for Migraine. Take one tablet at onset of headache. May repeat dose one time in 2 hours if headache not relieved., Disp: 9 tablet, Rfl: 2    Current Facility-Administered Medications:     ketorolac (TORADOL) injection 30 mg, 30 mg, Intramuscular, Q6H PRN, Dominic Aponte MD, 30 mg at 12/22/23 1343    Medicines reviewed by Jluis Blunt, [facility] on 12/27/2023 at  1:09 PM    Drug Interactions  None identified.       Initial Education Provided for Specialty Medication  The patient has been provided with the following education and any applicable administration techniques (i.e. self-injection) have been demonstrated for the therapies indicated. All questions and concerns have been addressed prior to the patient receiving the medication, and the patient has verbalized understanding of the education and any materials provided.  Additional patient education shall be provided and documented upon request by the patient, provider or payer.      Nurtec (rimegepant) 75 mg ODT, 1 tablet by mouth every other day  Medication Expectations   Why am I taking this medication? You are  taking this medication to treat an acute migraine.   What should I expect while on this medication? You should expect to see a decrease in the frequency and severity of your migraines.   How does the medication work? Nurtec is a small molecule that binds to calcitonin gene-related peptide (CGRP) and blocks its binding to the receptor decreasing the severity of migraines.   How long will I be on this medication for? The amount of time you will be on this medication will be determined by your doctor and your response to the medication.    How do I take this medication? Take as directed on your prescription label.   What are some possible side effects? Potential side effects including, but not limited to nausea. Patient verbalized understanding.   What happens if I miss a dose? Take the missed dose as soon as possible, and resume the every other day timed from the last dose.     Medication Safety   What are things I should warn my doctor immediately about? Hypersensitivity reactions - trouble breathing or swallowing.   What are things that I should be cautious of? Hypersensitivity reactions (eg, dyspnea, rash), including delayed serious reactions, have occurred; discontinue use if suspected    What are some medications that can interact with this one? Avoid concomitant administration of Nurtec ODT with strong inhibitors of CY, strong or moderate inducers of CYP3A or inhibitors of P-gp or BCRP. Avoid another dose of Nurtec ODT within 48 hours when it is administered with moderate inhibitors of CY. Ask your pharmacist or health care provider before starting new medications     Medication Storage/Handling   How should I handle this medication? Keep this medication out of reach of pets/children in original container. Ensure hands are dry before opening blister pack.   How does this medication need to be stored? Store at room temperature away from heat/cold, sunlight or moisture   How should I dispose of this  medication? There should not be a need to dispose of this medication unless your provider decides to change the dose or therapy. If that is the case, take to your local police station for proper disposal. Some pharmacies also have take-back bins for medication drop-off.      Resources/Support   How can I remind myself to take this medication? You can download reminder apps to help you manage your refills. You may also set an alarm on your phone to remind you. The pharmacy carries pill boxes that you can place next to an area you pass everyday (such as where you place your car keys or where you charge your phone)   Is financial support available?  Yes, [employer] can provide co-pay cards if you have commercial insurance or patient assistance if you have Medicare or no insurance.    Which vaccines are recommended for me? Talk to your doctor about these vaccines: Flu, Coronavirus (COVID-19), Pneumococcal (pneumonia), Tdap, Hepatitis B, Zoster (shingles)           Adherence and Self-Administration  Adherence related to the patient's specialty therapy was discussed with the patient. The Adherence segment of this outreach has been reviewed and updated.   Is there a concern with patient's ability to self administer the medication correctly and without issue?: No  Were any potential barriers to adherence identified during the initial assessment or patient education?: No  Are there any concerns regarding the patient's understanding of the importance of medication adherence?: No  Methods for Supporting Patient Adherence and/or Self-Administration: None needed at this time.    Goals of Therapy  Goals related to the patient's specialty therapy were discussed with the patient. The Patient Goals segment of this outreach has been reviewed and updated.   Goals Addressed Today        Specialty Pharmacy General Goal      Reduce severity and frequency of migraines and headaches. Starting rimegepant as prevention and  acute treatment per Dr. Aponte 12/27/23. Patient rates migraines as a 10/10 on a pain scale from 1-10 when most severe. Headaches can last 12 hours up to 6 days in duration. Headache frequency has been highly variable recently but has had daily headache symptoms for the last 10 days.                   Reassessment Plan & Follow-Up  Medication Therapy Changes: Starting rimegepant (Nurtec) every other day and as needed for prevention and acute treatment of migraines per patient's neurologist.   Related Plans, Therapy Recommendations, or Therapy Problems to Be Addressed: Nothing further to be addressed at this time.   Pharmacist to perform regular reassessments no more than (6) months from the previous assessment.  Care Coordinator to set up future refill outreaches, coordinate prescription delivery, and escalate clinical questions to pharmacist.   Welcome information and patient satisfaction survey to be sent by specialty pharmacy team with patient's initial fill.    Attestation  Therapeutic appropriateness: Appropriate   I attest the patient was actively involved in and has agreed to the above plan of care. If the prescribed therapy is at any point deemed not appropriate based on the current or future assessments, a consultation will be initiated with the patient's specialty care provider to determine the best course of action. The revised plan of therapy will be documented along with any additional patient education provided. Discussed aforementioned material with patient by phone.    Jluis Blunt, PharmD, [facility] [place]  [facility] Specialty Pharmacist, Neurology  12/27/2023  13:12 EST

## 2023-12-27 NOTE — ASSESSMENT & PLAN NOTE
38 year old right handed man with migraines which have increased significantly most recently and he has had a run of 10 continuous headache days.  He has had migraines since he was 4 or 5 years old.  The headaches are mostly in the front of his head with a throbbing quality which he rates as 10/10 on pain scale 1-10 when most severe with associated light and sound sensitivity along with nausea and vomiting.  Headaches can last 12 hours up to 6 days in duration.  He had the longest migraine in March 2022 for which he went to the ED and was prescribed sumatriptan 50 mg which he used and this got rid of his migraine.  He has completely cut out caffeine and Excedrin migraine.  There is family history of migraines in his sister.  He had a head CT scan done in 3/2015 which looks normal.  Stress and gluten tends to be known triggers for him.  Laying down in a cold and dark room tends to help with his migraines.  He denies any history of kidney stones or heart palpitations.  He does not have any auras with his migraines currently.  He returns today and tells me he has had a run of severe headaches for the past 10 days that have not resolved with the sumatriptan or more recent toradol injection in our office.  He is worried that these maybe different as he does not typically get headaches in such a long stretch.  He would like further assistance with his migraine today.  I spoke with him with regards to both acute and preventative treatments.  I will start him on Nurtec ODT for both acute and prevention of migraines as he has failed rizatriptan and sumatriptan in the past and he can use the every other day for prevention when he has more frequent runs.  I will also order a brain MRI scan for further evaluation as he typically gets occasional headaches and has had a 10 day stretch of continuous headaches which is outside of his norm and a change from baseline.  Discussed migraine triggers and lifestyle modifications and  provided patient education information today.

## 2023-12-27 NOTE — LETTER
December 27, 2023       No Recipients    Patient: Harinder Dale   YOB: 1985   Date of Visit: 12/27/2023     Dear Karson Solorzano Jr., APRN:       Thank you for referring Harinder Dale to me for evaluation. Below are the relevant portions of my assessment and plan of care.    If you have questions, please do not hesitate to call me. I look forward to following Harinder along with you.         Sincerely,        Dominic Aponte MD        CC:   No Recipients    Dominic Aponte MD  12/27/23 0841  Sign when Signing Visit  Chief Complaint  Migraine and Headache (Waking up with headache/)    Subjective         Harinder Dale presents to [facility] for   HISTORY OF PRESENT ILLNESS:    Harinder Dale is a 38 year old right handed man who returns to neurology clinic for follow up evaluation and treatment of migraines which have increased significantly most recently.  He has had migraines since he was 4 or 5 years old.  The headaches are mostly in the front of his head with a throbbing quality which he rates as 10/10 on pain scale 1-10 when most severe with associated light and sound sensitivity along with nausea and vomiting.  Headaches can last 12 hours up to 6 days in duration.  He had the longest migraine in March 2022 for which he went to the ED and was prescribed sumatriptan 50 mg which he used and this got rid of his migraine.  He has completely cut out caffeine and Excedrin migraine.  There is family history of migraines in his sister.  He had a head CT scan done in 3/2015 which looks normal.  Stress and gluten tends to be known triggers for him.  Laying down in a cold and dark room tends to help with his migraines.  He denies any history of kidney stones or heart palpitations.  He does not have any auras with his migraines currently.  He returns today and tells me he has had a run of severe headaches for the past 10 days that have not resolved with the sumatriptan or  "more recent toradol injection in our office.  He is worried that these maybe different as he does not typically get headaches in such a long stretch.  He would like further assistance with his migraine today.      Past Medical History:   Diagnosis Date   • Difficulty walking    • Migraine         Family History   Problem Relation Age of Onset   • Migraines Sister         Social History     Socioeconomic History   • Marital status: Single   Tobacco Use   • Smoking status: Former     Types: Cigarettes     Quit date: 3/2/2016     Years since quittin.8   • Smokeless tobacco: Never   Vaping Use   • Vaping Use: Never used   Substance and Sexual Activity   • Alcohol use: Not Currently        I have reviewed and confirmed the accuracy of the ROS as documented by the MA/LPN/RN Dominic Aponte MD   Review of Systems   Eyes:  Positive for photophobia and visual disturbance.   Gastrointestinal:  Negative for nausea and vomiting.   Neurological:  Positive for headache. Negative for dizziness, tremors, seizures, syncope, facial asymmetry, speech difficulty, weakness, light-headedness, numbness, memory problem and confusion.        Objective  Vital Signs:   /72   Pulse 70   Ht 167.6 cm (65.98\")   SpO2 97%   BMI 25.19 kg/m²       PHYSICAL EXAM:    General   Mental Status - Alert. General Appearance - Well developed, Well groomed, Oriented and Cooperative. Orientation - Oriented X3.       Head and Neck  Head - normocephalic, atraumatic with no lesions or palpable masses.  Neck    Global Assessment - supple.       Eye   Sclera/Conjunctiva - Bilateral - Normal.    ENMT  Mouth and Throat   Oral Cavity/Oropharynx: Oropharynx - the soft palate,uvula and tongue are normal in appearance.    Chest and Lung Exam   Chest - lung clear to auscultation bilaterally.    Cardiovascular   Cardiovascular examination reveals  - normal heart sounds, regular rate and rhythm.    Neurologic   Mental Status: Speech - Normal. Cognitive " function - appropriate fund of knowledge. No impairment of attention, Impairment of concentration, impairment of long term memory or impairment of short term memory.  Cranial Nerves:   II Optic: Visual acuity - Left - Normal. Right - Normal. Visual fields - Normal (to confrontation).  III Oculomotor: Pupillary constriction - Left - Normal. Right - Normal.  VII Facial: - Normal Bilaterally.   IX Glossopharyngeal / X Vagus - Normal.  XI Accessory: Trapezius - Bilateral - Normal. Sternocleidomastoid - Bilateral - Normal.  XII Hypoglossal - Bilateral - Normal.  Eye Movements: - Normal Bilaterally.  Sensory:   Light Touch: Intact - Globally.  Motor:   Bulk and Contour: - Normal.  Tone: - Normal.  Tremor: Not present.  Strength: 5/5 normal muscle strength - All Muscles.                                                        General Assessment of Reflexes: - deep tendon reflexes are normal. Coordination - No Impairment of finger-to-nose or Impairment of rapid alternating movements. Gait - Broad based, right knee is fused, uses it as crutch when walking.         Result Review:                 Assessment and Plan    Problem List Items Addressed This Visit          Neuro    Migraine without aura and with status migrainosus, not intractable - Primary    Current Assessment & Plan     38 year old right handed man with migraines which have increased significantly most recently and he has had a run of 10 continuous headache days.  He has had migraines since he was 4 or 5 years old.  The headaches are mostly in the front of his head with a throbbing quality which he rates as 10/10 on pain scale 1-10 when most severe with associated light and sound sensitivity along with nausea and vomiting.  Headaches can last 12 hours up to 6 days in duration.  He had the longest migraine in March 2022 for which he went to the ED and was prescribed sumatriptan 50 mg which he used and this got rid of his migraine.  He has completely cut out  caffeine and Excedrin migraine.  There is family history of migraines in his sister.  He had a head CT scan done in 3/2015 which looks normal.  Stress and gluten tends to be known triggers for him.  Laying down in a cold and dark room tends to help with his migraines.  He denies any history of kidney stones or heart palpitations.  He does not have any auras with his migraines currently.  He returns today and tells me he has had a run of severe headaches for the past 10 days that have not resolved with the sumatriptan or more recent toradol injection in our office.  He is worried that these maybe different as he does not typically get headaches in such a long stretch.  He would like further assistance with his migraine today.  I spoke with him with regards to both acute and preventative treatments.  I will start him on Nurtec ODT for both acute and prevention of migraines as he has failed rizatriptan and sumatriptan in the past and he can use the every other day for prevention when he has more frequent runs.  I will also order a brain MRI scan for further evaluation as he typically gets occasional headaches and has had a 10 day stretch of continuous headaches which is outside of his norm and a change from baseline.  Discussed migraine triggers and lifestyle modifications and provided patient education information today.          Relevant Medications    Rimegepant Sulfate (Nurtec) 75 MG tablet dispersible tablet    Rimegepant Sulfate (Nurtec) 75 MG tablet dispersible tablet    Other Relevant Orders    MRI Brain With & Without Contrast       Follow Up   Return in about 2 months (around 2/27/2024).  Patient was given instructions and counseling regarding his condition or for health maintenance advice. Please see specific information pulled into the AVS if appropriate.

## 2023-12-27 NOTE — PROGRESS NOTES
Chief Complaint  Migraine and Headache (Waking up with headache/)    Subjective          Harinder Dale presents to [facility] NEUROLOGY for   HISTORY OF PRESENT ILLNESS:    Harinder Dale is a 38 year old right handed man who returns to neurology clinic for follow up evaluation and treatment of migraines which have increased significantly most recently.  He has had migraines since he was 4 or 5 years old.  The headaches are mostly in the front of his head with a throbbing quality which he rates as 10/10 on pain scale 1-10 when most severe with associated light and sound sensitivity along with nausea and vomiting.  Headaches can last 12 hours up to 6 days in duration.  He had the longest migraine in March 2022 for which he went to the ED and was prescribed sumatriptan 50 mg which he used and this got rid of his migraine.  He has completely cut out caffeine and Excedrin migraine.  There is family history of migraines in his sister.  He had a head CT scan done in 3/2015 which looks normal.  Stress and gluten tends to be known triggers for him.  Laying down in a cold and dark room tends to help with his migraines.  He denies any history of kidney stones or heart palpitations.  He does not have any auras with his migraines currently.  He returns today and tells me he has had a run of severe headaches for the past 10 days that have not resolved with the sumatriptan or more recent toradol injection in our office.  He is worried that these maybe different as he does not typically get headaches in such a long stretch.  He would like further assistance with his migraine today.      Past Medical History:   Diagnosis Date    Difficulty walking     Migraine         Family History   Problem Relation Age of Onset    Migraines Sister         Social History     Socioeconomic History    Marital status: Single   Tobacco Use    Smoking status: Former     Types: Cigarettes     Quit date: 3/2/2016     Years since  "quittin.8    Smokeless tobacco: Never   Vaping Use    Vaping Use: Never used   Substance and Sexual Activity    Alcohol use: Not Currently        I have reviewed and confirmed the accuracy of the ROS as documented by the MA/LPN/RN Dominic Aponte MD   Review of Systems   Eyes:  Positive for photophobia and visual disturbance.   Gastrointestinal:  Negative for nausea and vomiting.   Neurological:  Positive for headache. Negative for dizziness, tremors, seizures, syncope, facial asymmetry, speech difficulty, weakness, light-headedness, numbness, memory problem and confusion.        Objective   Vital Signs:   /72   Pulse 70   Ht 167.6 cm (65.98\")   SpO2 97%   BMI 25.19 kg/m²       PHYSICAL EXAM:    General   Mental Status - Alert. General Appearance - Well developed, Well groomed, Oriented and Cooperative. Orientation - Oriented X3.       Head and Neck  Head - normocephalic, atraumatic with no lesions or palpable masses.  Neck    Global Assessment - supple.       Eye   Sclera/Conjunctiva - Bilateral - Normal.    ENMT  Mouth and Throat   Oral Cavity/Oropharynx: Oropharynx - the soft palate,uvula and tongue are normal in appearance.    Chest and Lung Exam   Chest - lung clear to auscultation bilaterally.    Cardiovascular   Cardiovascular examination reveals  - normal heart sounds, regular rate and rhythm.    Neurologic   Mental Status: Speech - Normal. Cognitive function - appropriate fund of knowledge. No impairment of attention, Impairment of concentration, impairment of long term memory or impairment of short term memory.  Cranial Nerves:   II Optic: Visual acuity - Left - Normal. Right - Normal. Visual fields - Normal (to confrontation).  III Oculomotor: Pupillary constriction - Left - Normal. Right - Normal.  VII Facial: - Normal Bilaterally.   IX Glossopharyngeal / X Vagus - Normal.  XI Accessory: Trapezius - Bilateral - Normal. Sternocleidomastoid - Bilateral - Normal.  XII Hypoglossal - Bilateral - " Normal.  Eye Movements: - Normal Bilaterally.  Sensory:   Light Touch: Intact - Globally.  Motor:   Bulk and Contour: - Normal.  Tone: - Normal.  Tremor: Not present.  Strength: 5/5 normal muscle strength - All Muscles.                                                        General Assessment of Reflexes: - deep tendon reflexes are normal. Coordination - No Impairment of finger-to-nose or Impairment of rapid alternating movements. Gait - Broad based, right knee is fused, uses it as crutch when walking.         Result Review :                 Assessment and Plan    Problem List Items Addressed This Visit          Neuro    Migraine without aura and with status migrainosus, not intractable - Primary    Current Assessment & Plan     38 year old right handed man with migraines which have increased significantly most recently and he has had a run of 10 continuous headache days.  He has had migraines since he was 4 or 5 years old.  The headaches are mostly in the front of his head with a throbbing quality which he rates as 10/10 on pain scale 1-10 when most severe with associated light and sound sensitivity along with nausea and vomiting.  Headaches can last 12 hours up to 6 days in duration.  He had the longest migraine in March 2022 for which he went to the ED and was prescribed sumatriptan 50 mg which he used and this got rid of his migraine.  He has completely cut out caffeine and Excedrin migraine.  There is family history of migraines in his sister.  He had a head CT scan done in 3/2015 which looks normal.  Stress and gluten tends to be known triggers for him.  Laying down in a cold and dark room tends to help with his migraines.  He denies any history of kidney stones or heart palpitations.  He does not have any auras with his migraines currently.  He returns today and tells me he has had a run of severe headaches for the past 10 days that have not resolved with the sumatriptan or more recent toradol injection in  our office.  He is worried that these maybe different as he does not typically get headaches in such a long stretch.  He would like further assistance with his migraine today.  I spoke with him with regards to both acute and preventative treatments.  I will start him on Nurtec ODT for both acute and prevention of migraines as he has failed rizatriptan and sumatriptan in the past and he can use the every other day for prevention when he has more frequent runs.  I will also order a brain MRI scan for further evaluation as he typically gets occasional headaches and has had a 10 day stretch of continuous headaches which is outside of his norm and a change from baseline.  Discussed migraine triggers and lifestyle modifications and provided patient education information today.          Relevant Medications    Rimegepant Sulfate (Nurtec) 75 MG tablet dispersible tablet    Rimegepant Sulfate (Nurtec) 75 MG tablet dispersible tablet    Other Relevant Orders    MRI Brain With & Without Contrast       Follow Up   Return in about 2 months (around 2/27/2024).  Patient was given instructions and counseling regarding his condition or for health maintenance advice. Please see specific information pulled into the AVS if appropriate.

## 2024-01-24 ENCOUNTER — SPECIALTY PHARMACY (OUTPATIENT)
Dept: INFUSION THERAPY | Facility: HOSPITAL | Age: 39
End: 2024-01-24
Payer: COMMERCIAL

## 2024-01-24 NOTE — PROGRESS NOTES
Specialty Pharmacy Refill Coordination Note     Nurtec  #16 Approved, Coverage Starts on: 1/24/2024 12:00:00 AM, Coverage Ends on: 1/23/2025 12:00:00 AM.      Shadia Rene  Specialty Pharmacy Technician

## 2024-02-01 ENCOUNTER — HOSPITAL ENCOUNTER (OUTPATIENT)
Facility: HOSPITAL | Age: 39
Discharge: HOME | End: 2024-02-01
Admitting: PSYCHIATRY & NEUROLOGY
Payer: COMMERCIAL

## 2024-02-01 DIAGNOSIS — G43.001 MIGRAINE WITHOUT AURA AND WITH STATUS MIGRAINOSUS, NOT INTRACTABLE: ICD-10-CM

## 2024-02-01 PROCEDURE — 70553 MRI BRAIN STEM W/O & W/DYE: CPT

## 2024-02-01 PROCEDURE — A9577 INJ MULTIHANCE: HCPCS | Performed by: PSYCHIATRY & NEUROLOGY

## 2024-02-01 PROCEDURE — 0 GADOBENATE DIMEGLUMINE 529 MG/ML SOLUTION: Performed by: PSYCHIATRY & NEUROLOGY

## 2024-02-01 RX ADMIN — GADOBENATE DIMEGLUMINE 15 ML: 529 INJECTION, SOLUTION INTRAVENOUS at 17:27

## 2024-02-05 ENCOUNTER — TELEPHONE (OUTPATIENT)
Dept: NEUROLOGY | Facility: CLINIC | Age: 39
End: 2024-02-05
Payer: COMMERCIAL

## 2024-02-19 ENCOUNTER — OFFICE VISIT (OUTPATIENT)
Dept: NEUROLOGY | Facility: CLINIC | Age: 39
End: 2024-02-19
Payer: COMMERCIAL

## 2024-02-19 VITALS
HEIGHT: 66 IN | SYSTOLIC BLOOD PRESSURE: 116 MMHG | HEART RATE: 61 BPM | WEIGHT: 156 LBS | DIASTOLIC BLOOD PRESSURE: 72 MMHG | BODY MASS INDEX: 25.07 KG/M2

## 2024-02-19 DIAGNOSIS — G43.001 MIGRAINE WITHOUT AURA AND WITH STATUS MIGRAINOSUS, NOT INTRACTABLE: ICD-10-CM

## 2024-02-19 DIAGNOSIS — G43.009 MIGRAINE WITHOUT AURA AND WITHOUT STATUS MIGRAINOSUS, NOT INTRACTABLE: ICD-10-CM

## 2024-02-19 PROCEDURE — 99214 OFFICE O/P EST MOD 30 MIN: CPT | Performed by: PSYCHIATRY & NEUROLOGY

## 2024-02-19 RX ORDER — SUMATRIPTAN 50 MG/1
50 TABLET, FILM COATED ORAL ONCE AS NEEDED
Qty: 9 TABLET | Refills: 2 | Status: SHIPPED | OUTPATIENT
Start: 2024-02-19

## 2024-02-19 RX ORDER — RIMEGEPANT SULFATE 75 MG/75MG
75 TABLET, ORALLY DISINTEGRATING ORAL EVERY OTHER DAY
Qty: 16 TABLET | Refills: 2 | Status: SHIPPED | OUTPATIENT
Start: 2024-02-19

## 2024-02-19 NOTE — ASSESSMENT & PLAN NOTE
38 year old right handed man with migraines.  He has had migraines since he was 4 or 5 years old.  The headaches are mostly in the front of his head with a throbbing quality which he rates as 10/10 on pain scale 1-10 when most severe with associated light and sound sensitivity along with nausea and vomiting.  Headaches can last 12 hours up to 6 days in duration.  He had the longest migraine in March 2022 for which he went to the ED and was prescribed sumatriptan 50 mg which he used and this got rid of his migraine.  He has completely cut out caffeine and Excedrin migraine.  There is family history of migraines in his sister.  He had a head CT scan done in 3/2015 which looks normal.  Stress and gluten tends to be known triggers for him.  Laying down in a cold and dark room tends to help with his migraines.  He denies any history of kidney stones or heart palpitations.  He does not have any auras with his migraines currently.  He returns today and tells me today is doing better with taking the Nurtec ODT every other day for migraine prevention.  He has used sumatriptan as needed rarely as well for breakthrough migraines.   He had a brain MRI scan on 2/1/2024 which I reviewed the images independently on his visit with patient today and looks normal.  Will continue the Nurtec ODT every other day for prevention and he can use the Nurtec acutely in between those days as well as the sumatriptan if needed.  Discussed migraine triggers and lifestyle modifications and provided patient education information today.  He has noticed that stress and wheat can be triggers for his migraines.

## 2024-02-19 NOTE — PROGRESS NOTES
Chief Complaint  Migraine (Nurtec doing well-  MRI completed )    Subjective          Harinder Dale presents to [facility] NEUROLOGY for   HISTORY OF PRESENT ILLNESS:    Harinder Dale is a 38 year old right handed man who returns to neurology clinic for follow up evaluation and treatment of migraines.  He has had migraines since he was 4 or 5 years old.  The headaches are mostly in the front of his head with a throbbing quality which he rates as 10/10 on pain scale 1-10 when most severe with associated light and sound sensitivity along with nausea and vomiting.  Headaches can last 12 hours up to 6 days in duration.  He had the longest migraine in 2022 for which he went to the ED and was prescribed sumatriptan 50 mg which he used and this got rid of his migraine.  He has completely cut out caffeine and Excedrin migraine.  There is family history of migraines in his sister.  He had a head CT scan done in 3/2015 which looks normal.  Stress and gluten tends to be known triggers for him.  Laying down in a cold and dark room tends to help with his migraines.  He denies any history of kidney stones or heart palpitations.  He does not have any auras with his migraines currently.  He returns today and tells me today is doing better with taking the Nurtec ODT every other day for migraine prevention.  He has used sumatriptan as needed rarely as well for breakthrough migraines.   He had a brain MRI scan on 2024 which I reviewed the images independently on his visit with patient today and looks normal.      Past Medical History:   Diagnosis Date    Difficulty walking     Migraine         Family History   Problem Relation Age of Onset    Migraines Sister         Social History     Socioeconomic History    Marital status: Single   Tobacco Use    Smoking status: Former     Types: Cigarettes     Quit date: 3/2/2016     Years since quittin.9    Smokeless tobacco: Never   Vaping Use    Vaping Use:  "Never used   Substance and Sexual Activity    Alcohol use: Not Currently        I have reviewed and confirmed the accuracy of the ROS as documented by the MA/LPN/RN Dominic Aponte MD   Review of Systems   Neurological:  Positive for headache. Negative for dizziness, tremors, seizures, syncope, facial asymmetry, speech difficulty, weakness, light-headedness, numbness, memory problem and confusion.   Psychiatric/Behavioral:  Negative for agitation, behavioral problems, decreased concentration, dysphoric mood, hallucinations, self-injury, sleep disturbance, suicidal ideas, negative for hyperactivity, depressed mood and stress. The patient is not nervous/anxious.         Objective   Vital Signs:   /72   Pulse 61   Ht 167.6 cm (65.98\")   Wt 70.8 kg (156 lb)   BMI 25.19 kg/m²       PHYSICAL EXAM:    General   Mental Status - Alert. General Appearance - Well developed, Well groomed, Oriented and Cooperative. Orientation - Oriented X3.       Head and Neck  Head - normocephalic, atraumatic with no lesions or palpable masses.  Neck    Global Assessment - supple.       Eye   Sclera/Conjunctiva - Bilateral - Normal.    ENMT  Mouth and Throat   Oral Cavity/Oropharynx: Oropharynx - the soft palate,uvula and tongue are normal in appearance.    Chest and Lung Exam   Chest - lung clear to auscultation bilaterally.    Cardiovascular   Cardiovascular examination reveals  - normal heart sounds, regular rate and rhythm.    Neurologic   Mental Status: Speech - Normal. Cognitive function - appropriate fund of knowledge. No impairment of attention, Impairment of concentration, impairment of long term memory or impairment of short term memory.  Cranial Nerves:   II Optic: Visual acuity - Left - Normal. Right - Normal. Visual fields - Normal (to confrontation).  III Oculomotor: Pupillary constriction - Left - Normal. Right - Normal.  VII Facial: - Normal Bilaterally.   IX Glossopharyngeal / X Vagus - Normal.  XI Accessory: " Trapezius - Bilateral - Normal. Sternocleidomastoid - Bilateral - Normal.  XII Hypoglossal - Bilateral - Normal.  Eye Movements: - Normal Bilaterally.  Sensory:   Light Touch: Intact - Globally.  Motor:   Bulk and Contour: - Normal.  Tone: - Normal.  Tremor: Not present.  Strength: 5/5 normal muscle strength - All Muscles.                                                        General Assessment of Reflexes: - deep tendon reflexes are normal. Coordination - No Impairment of finger-to-nose or Impairment of rapid alternating movements. Gait - Broad based, right knee is fused.     Result Review :                 Assessment and Plan    Problem List Items Addressed This Visit          Neuro    Migraine without aura and with status migrainosus, not intractable    Current Assessment & Plan     38 year old right handed man with migraines.  He has had migraines since he was 4 or 5 years old.  The headaches are mostly in the front of his head with a throbbing quality which he rates as 10/10 on pain scale 1-10 when most severe with associated light and sound sensitivity along with nausea and vomiting.  Headaches can last 12 hours up to 6 days in duration.  He had the longest migraine in March 2022 for which he went to the ED and was prescribed sumatriptan 50 mg which he used and this got rid of his migraine.  He has completely cut out caffeine and Excedrin migraine.  There is family history of migraines in his sister.  He had a head CT scan done in 3/2015 which looks normal.  Stress and gluten tends to be known triggers for him.  Laying down in a cold and dark room tends to help with his migraines.  He denies any history of kidney stones or heart palpitations.  He does not have any auras with his migraines currently.  He returns today and tells me today is doing better with taking the Nurtec ODT every other day for migraine prevention.  He has used sumatriptan as needed rarely as well for breakthrough migraines.   He had a  brain MRI scan on 2/1/2024 which I reviewed the images independently on his visit with patient today and looks normal.  Will continue the Nurtec ODT every other day for prevention and he can use the Nurtec acutely in between those days as well as the sumatriptan if needed.  Discussed migraine triggers and lifestyle modifications and provided patient education information today.  He has noticed that stress and wheat can be triggers for his migraines.           Relevant Medications    Rimegepant Sulfate (Nurtec) 75 MG tablet dispersible tablet    SUMAtriptan (Imitrex) 50 MG tablet     Other Visit Diagnoses       Migraine without aura and without status migrainosus, not intractable        Relevant Medications    Rimegepant Sulfate (Nurtec) 75 MG tablet dispersible tablet    SUMAtriptan (Imitrex) 50 MG tablet            Follow Up   Return in about 3 months (around 5/19/2024).  Patient was given instructions and counseling regarding his condition or for health maintenance advice. Please see specific information pulled into the AVS if appropriate.

## 2024-03-11 ENCOUNTER — SPECIALTY PHARMACY (OUTPATIENT)
Dept: NEUROLOGY | Facility: CLINIC | Age: 39
End: 2024-03-11
Payer: COMMERCIAL

## 2024-03-11 NOTE — PROGRESS NOTES
Specialty Pharmacy Refill Coordination Note     Harinder is a 38 y.o. male contacted today regarding refills of  [facility] specialty medication(s).    Reviewed and verified with patient:       Specialty medication(s) and dose(s) confirmed: yes    Refill Questions      Flowsheet Row Most Recent Value   Changes to allergies? No   Changes to medications? No   New conditions or infections since last clinic visit No   Unplanned office visit, urgent care, ED, or hospital admission in the last 4 weeks  No   How does patient/caregiver feel medication is working? Very good   Financial problems or insurance changes  No   Since the previous refill, were any specialty medication doses or scheduled injections missed or delayed?  No   Does this patient require a clinical escalation to a pharmacist? No            Delivery Questions      Flowsheet Row Most Recent Value   Delivery method Beeline   Delivery address verified with patient/caregiver? Yes   Delivery address Home   Number of medications in delivery 1   Medication(s) being filled and delivered Rimegepant Sulfate   Doses left of specialty medications 5   Copay verified? Yes  [SPRX-ERX DOWN: $0 CH COPAY CARD DOWN last fill and consent obtained for potential copay differences.]   Copay amount SPRX-ERX DOWN: $0 CH COPAY CARD DOWN last fill and consent obtained for potential copay differences.   Copay form of payment No copayment ($0)                   Follow-up: 21 day(s)     Anyi Watson, Pharmacy Technician  Specialty Pharmacy Technician

## 2024-04-08 ENCOUNTER — SPECIALTY PHARMACY (OUTPATIENT)
Dept: NEUROLOGY | Facility: CLINIC | Age: 39
End: 2024-04-08
Payer: COMMERCIAL

## 2024-04-08 NOTE — PROGRESS NOTES
Specialty Pharmacy Refill Coordination Note     Harinder is a 38 y.o. male contacted today regarding refills of  [facility] specialty medication(s).    Reviewed and verified with patient:       Specialty medication(s) and dose(s) confirmed: yes    Refill Questions      Flowsheet Row Most Recent Value   Changes to allergies? No   Changes to medications? No   New conditions or infections since last clinic visit No   Unplanned office visit, urgent care, ED, or hospital admission in the last 4 weeks  No   How does patient/caregiver feel medication is working? Very good   Financial problems or insurance changes  No   Since the previous refill, were any specialty medication doses or scheduled injections missed or delayed?  No   Does this patient require a clinical escalation to a pharmacist? No            Delivery Questions      Flowsheet Row Most Recent Value   Delivery method Beeline   Delivery address verified with patient/caregiver? Yes   Delivery address Home   Number of medications in delivery 1   Medication(s) being filled and delivered Rimegepant Sulfate   Copay verified? Yes   Copay amount $0   Copay form of payment No copayment ($0)                   Follow-up: 21 day(s)     Anyi Watson, Pharmacy Technician  Specialty Pharmacy Technician

## 2024-05-07 ENCOUNTER — SPECIALTY PHARMACY (OUTPATIENT)
Dept: NEUROLOGY | Facility: CLINIC | Age: 39
End: 2024-05-07
Payer: COMMERCIAL

## 2024-05-14 ENCOUNTER — OFFICE VISIT (OUTPATIENT)
Dept: FAMILY MEDICINE CLINIC | Facility: CLINIC | Age: 39
End: 2024-05-14
Payer: COMMERCIAL

## 2024-05-14 ENCOUNTER — SPECIALTY PHARMACY (OUTPATIENT)
Dept: NEUROLOGY | Facility: CLINIC | Age: 39
End: 2024-05-14
Payer: COMMERCIAL

## 2024-05-14 VITALS
OXYGEN SATURATION: 98 % | BODY MASS INDEX: 24.59 KG/M2 | HEIGHT: 66 IN | RESPIRATION RATE: 16 BRPM | WEIGHT: 153 LBS | SYSTOLIC BLOOD PRESSURE: 120 MMHG | DIASTOLIC BLOOD PRESSURE: 80 MMHG | HEART RATE: 73 BPM

## 2024-05-14 DIAGNOSIS — R68.82 DECREASED LIBIDO: ICD-10-CM

## 2024-05-14 DIAGNOSIS — E55.9 VITAMIN D DEFICIENCY: ICD-10-CM

## 2024-05-14 DIAGNOSIS — Z00.00 WELLNESS EXAMINATION: Primary | ICD-10-CM

## 2024-05-14 DIAGNOSIS — Z00.00 LABORATORY EXAM ORDERED AS PART OF ROUTINE GENERAL MEDICAL EXAMINATION: ICD-10-CM

## 2024-05-14 PROCEDURE — 99385 PREV VISIT NEW AGE 18-39: CPT | Performed by: NURSE PRACTITIONER

## 2024-05-14 NOTE — PROGRESS NOTES
Subjective   Harinder Dale is a 38 y.o. male.     Fatigue  Associated symptoms include fatigue. Pertinent negatives include no chest pain, coughing or rash.      Harinder Dale 38 y.o. male who presents today for a new patient appointment.    he has a history of   Patient Active Problem List   Diagnosis    Migraine without aura and with status migrainosus, not intractable   .  he is here to establish care and is here for a wellness check up I reviewed the PFSH recorded today by my MA/LPN staff.   he   He has been feeling overall well.     Preventative counseling provided and diet/exercise and vaccinations.     He reports some decreased libido and would like to have testosterone checked with his labs.      He denies any history of elevated cholesterol or HTN.    He denies FH of colon cancer or prostate cancer.      Patient had right leg fusion at the knee at age 18 due to traumatic accident where tree rolled over his leg. This left him unable to bend his knee.  He does swimming for exercise and reports eating balanced diet.     The following portions of the patient's history were reviewed and updated as appropriate: allergies, current medications, past family history, past medical history, past social history, past surgical history, and problem list.    Review of Systems   Constitutional:  Positive for fatigue.   Respiratory:  Negative for cough and shortness of breath.    Cardiovascular:  Negative for chest pain and palpitations.   Skin:  Negative for rash.   Psychiatric/Behavioral:  Negative for dysphoric mood and sleep disturbance. The patient is not nervous/anxious.        Objective   Physical Exam  Vitals and nursing note reviewed.   Constitutional:       Appearance: Normal appearance. He is well-developed.   Neck:      Vascular: No carotid bruit.   Cardiovascular:      Rate and Rhythm: Normal rate and regular rhythm.   Pulmonary:      Effort: Pulmonary effort is normal.      Breath sounds: Normal breath  sounds.   Skin:     General: Skin is warm and dry.   Neurological:      Mental Status: He is alert and oriented to person, place, and time.   Psychiatric:         Behavior: Behavior normal.         Thought Content: Thought content normal.         Judgment: Judgment normal.         Assessment & Plan   Diagnoses and all orders for this visit:    1. Wellness examination (Primary)    2. Laboratory exam ordered as part of routine general medical examination  -     Comprehensive metabolic panel  -     Lipid panel  -     CBC and Differential  -     TSH  -     Vitamin D 25 hydroxy  -     Vitamin B12  -     Testosterone, Free, Total    3. Vitamin D deficiency  -     Comprehensive metabolic panel  -     Lipid panel  -     CBC and Differential  -     TSH  -     Vitamin D 25 hydroxy  -     Vitamin B12  -     Testosterone, Free, Total    4. Decreased libido  -     Comprehensive metabolic panel  -     Lipid panel  -     CBC and Differential  -     TSH  -     Vitamin D 25 hydroxy  -     Vitamin B12  -     Testosterone, Free, Total

## 2024-05-14 NOTE — PROGRESS NOTES
Specialty Pharmacy Refill Coordination Note     Harinder is a 38 y.o. male contacted today regarding refills of  Sumatriptan specialty medication(s).    Reviewed and verified with patient:       Specialty medication(s) and dose(s) confirmed: yes    Refill Questions      Flowsheet Row Most Recent Value   Changes to allergies? No   Changes to medications? No   New conditions or infections since last clinic visit No   Unplanned office visit, urgent care, ED, or hospital admission in the last 4 weeks  No   How does patient/caregiver feel medication is working? Very good   Financial problems or insurance changes  No   Since the previous refill, were any specialty medication doses or scheduled injections missed or delayed?  No   Does this patient require a clinical escalation to a pharmacist? No            Delivery Questions      Flowsheet Row Most Recent Value   Delivery method Beeline   Delivery address verified with patient/caregiver? Yes   Delivery address Home   Number of medications in delivery 1   Medication(s) being filled and delivered Sumatriptan Succinate (Serotonin Agonists)   Copay verified? Yes   Copay amount $0   Copay form of payment No copayment ($0)                   Follow-up: 21 day(s)     Anyi Watson, Pharmacy Technician  Specialty Pharmacy Technician

## 2024-05-23 LAB
25(OH)D3+25(OH)D2 SERPL-MCNC: 26.6 NG/ML (ref 30–100)
ALBUMIN SERPL-MCNC: 4.6 G/DL (ref 3.5–5.2)
ALBUMIN/GLOB SERPL: 1.8 G/DL
ALP SERPL-CCNC: 66 U/L (ref 39–117)
ALT SERPL-CCNC: 44 U/L (ref 1–41)
AST SERPL-CCNC: 34 U/L (ref 1–40)
BASOPHILS # BLD AUTO: 0.07 10*3/MM3 (ref 0–0.2)
BASOPHILS NFR BLD AUTO: 1.6 % (ref 0–1.5)
BILIRUB SERPL-MCNC: 0.6 MG/DL (ref 0–1.2)
BUN SERPL-MCNC: 14 MG/DL (ref 6–20)
BUN/CREAT SERPL: 12.7 (ref 7–25)
CALCIUM SERPL-MCNC: 9.7 MG/DL (ref 8.6–10.5)
CHLORIDE SERPL-SCNC: 101 MMOL/L (ref 98–107)
CHOLEST SERPL-MCNC: 224 MG/DL (ref 0–200)
CO2 SERPL-SCNC: 28.1 MMOL/L (ref 22–29)
CREAT SERPL-MCNC: 1.1 MG/DL (ref 0.76–1.27)
EGFRCR SERPLBLD CKD-EPI 2021: 88.1 ML/MIN/1.73
EOSINOPHIL # BLD AUTO: 0.13 10*3/MM3 (ref 0–0.4)
EOSINOPHIL NFR BLD AUTO: 2.9 % (ref 0.3–6.2)
ERYTHROCYTE [DISTWIDTH] IN BLOOD BY AUTOMATED COUNT: 11.9 % (ref 12.3–15.4)
GLOBULIN SER CALC-MCNC: 2.5 GM/DL
GLUCOSE SERPL-MCNC: 87 MG/DL (ref 65–99)
HCT VFR BLD AUTO: 50.2 % (ref 37.5–51)
HDLC SERPL-MCNC: 53 MG/DL (ref 40–60)
HGB BLD-MCNC: 16.5 G/DL (ref 13–17.7)
IMM GRANULOCYTES # BLD AUTO: 0 10*3/MM3 (ref 0–0.05)
IMM GRANULOCYTES NFR BLD AUTO: 0 % (ref 0–0.5)
LDLC SERPL CALC-MCNC: 153 MG/DL (ref 0–100)
LYMPHOCYTES # BLD AUTO: 1.31 10*3/MM3 (ref 0.7–3.1)
LYMPHOCYTES NFR BLD AUTO: 29.2 % (ref 19.6–45.3)
MCH RBC QN AUTO: 29.7 PG (ref 26.6–33)
MCHC RBC AUTO-ENTMCNC: 32.9 G/DL (ref 31.5–35.7)
MCV RBC AUTO: 90.3 FL (ref 79–97)
MONOCYTES # BLD AUTO: 0.48 10*3/MM3 (ref 0.1–0.9)
MONOCYTES NFR BLD AUTO: 10.7 % (ref 5–12)
NEUTROPHILS # BLD AUTO: 2.5 10*3/MM3 (ref 1.7–7)
NEUTROPHILS NFR BLD AUTO: 55.6 % (ref 42.7–76)
NRBC BLD AUTO-RTO: 0 /100 WBC (ref 0–0.2)
PLATELET # BLD AUTO: 216 10*3/MM3 (ref 140–450)
POTASSIUM SERPL-SCNC: 4.7 MMOL/L (ref 3.5–5.2)
PROT SERPL-MCNC: 7.1 G/DL (ref 6–8.5)
RBC # BLD AUTO: 5.56 10*6/MM3 (ref 4.14–5.8)
SODIUM SERPL-SCNC: 139 MMOL/L (ref 136–145)
TESTOST FREE SERPL-MCNC: 10.2 PG/ML (ref 8.7–25.1)
TESTOST SERPL-MCNC: 375 NG/DL (ref 264–916)
TRIGL SERPL-MCNC: 101 MG/DL (ref 0–150)
TSH SERPL DL<=0.005 MIU/L-ACNC: 2.47 UIU/ML (ref 0.27–4.2)
VIT B12 SERPL-MCNC: 687 PG/ML (ref 211–946)
VLDLC SERPL CALC-MCNC: 18 MG/DL (ref 5–40)
WBC # BLD AUTO: 4.49 10*3/MM3 (ref 3.4–10.8)

## 2024-06-04 ENCOUNTER — SPECIALTY PHARMACY (OUTPATIENT)
Dept: NEUROLOGY | Facility: CLINIC | Age: 39
End: 2024-06-04
Payer: COMMERCIAL

## 2024-06-04 ENCOUNTER — SPECIALTY PHARMACY (OUTPATIENT)
Dept: PHARMACY | Facility: TELEHEALTH | Age: 39
End: 2024-06-04
Payer: COMMERCIAL

## 2024-06-04 DIAGNOSIS — G43.001 MIGRAINE WITHOUT AURA AND WITH STATUS MIGRAINOSUS, NOT INTRACTABLE: ICD-10-CM

## 2024-06-04 RX ORDER — RIMEGEPANT SULFATE 75 MG/75MG
75 TABLET, ORALLY DISINTEGRATING ORAL EVERY OTHER DAY
Qty: 16 TABLET | Refills: 2 | Status: SHIPPED | OUTPATIENT
Start: 2024-06-04

## 2024-06-04 NOTE — PROGRESS NOTES
Specialty Pharmacy Refill Coordination Note     Harinder is a 38 y.o. male contacted today regarding refills of  [facility] specialty medication(s).    Reviewed and verified with patient:       Specialty medication(s) and dose(s) confirmed: yes    Refill Questions      Flowsheet Row Most Recent Value   Changes to allergies? No   Changes to medications? No   New conditions or infections since last clinic visit No   Unplanned office visit, urgent care, ED, or hospital admission in the last 4 weeks  No   How does patient/caregiver feel medication is working? Very good   Financial problems or insurance changes  No   Since the previous refill, were any specialty medication doses or scheduled injections missed or delayed?  No   Does this patient require a clinical escalation to a pharmacist? No            Delivery Questions      Flowsheet Row Most Recent Value   Delivery method Beeline   Delivery address verified with patient/caregiver? Yes   Delivery address Home   Number of medications in delivery 1   Medication(s) being filled and delivered Rimegepant Sulfate   Doses left of specialty medications 5   Copay verified? Yes   Copay amount $0   Copay form of payment No copayment ($0)                   Follow-up: 21 day(s)     Anyi Watson, Pharmacy Technician  Specialty Pharmacy Technician

## 2024-06-18 ENCOUNTER — SPECIALTY PHARMACY (OUTPATIENT)
Dept: PHARMACY | Facility: TELEHEALTH | Age: 39
End: 2024-06-18
Payer: COMMERCIAL

## 2024-06-18 NOTE — PROGRESS NOTES
Specialty Pharmacy Patient Management Program  Reassessment     Harinder Dale is a 39 y.o. male with chronic migraines and enrolled in the Patient Management program offered by [facility]. A follow-up outreach was conducted, including assessment of continued therapy appropriateness, medication adherence, and side effect incidence and management for Nurtec.     Changes to Insurance Coverage or Financial Support  none    Relevant Past Medical History and Comorbidities  Relevant medical history and concomitant health conditions were discussed with the patient. The patient's chart has been reviewed for relevant past medical history and comorbid health conditions and updated as necessary.   Past Medical History:   Diagnosis Date    Difficulty walking     Migraine      Social History     Socioeconomic History    Marital status: Single   Tobacco Use    Smoking status: Former     Current packs/day: 0.00     Types: Cigarettes     Quit date: 3/2/2016     Years since quittin.3    Smokeless tobacco: Never   Vaping Use    Vaping status: Never Used   Substance and Sexual Activity    Alcohol use: Not Currently    Sexual activity: Defer     Problem list reviewed by Stoney Shaw RPH on 2024 at 10:51 AM    Allergies  Known allergies and reactions were discussed with the patient. The patient's chart has been reviewed for allergy information and updated as necessary.   No Known Allergies  Allergies reviewed by Stoney Shaw RPH on 2024 at 10:50 AM    Relevant Laboratory Values  Lab Results   Component Value Date    GLUCOSE 87 2024    CALCIUM 9.7 2024     2024    K 4.7 2024    CO2 28.1 2024     2024    BUN 14 2024    CREATININE 1.10 2024    EGFRRESULT 88.1 2024    BCR 12.7 2024     Lab Results   Component Value Date    WBC 4.49 2024    HGB 16.5 2024    HCT 50.2 2024    MCV 90.3 2024    PLT  216 05/22/2024     Lab Value Review  The above lab values have been reviewed; the following specialty medication(s) dose adjustment(s) are recommended: none.    Current Medication List  This medication list has been reviewed with the patient and evaluated for any interactions or necessary modifications/recommendations, and updated to include all prescription medications, OTC medications, and supplements the patient is currently taking. This list reflects what is contained in the patient's profile, which has also been marked as reviewed to communicate to other providers it is the most up to date version of the patient's current medication therapy.     Current Outpatient Medications:     ibuprofen (ADVIL,MOTRIN) 400 MG tablet, Take 1 tablet by mouth., Disp: , Rfl:     Rimegepant Sulfate (Nurtec) 75 MG tablet dispersible tablet, Take 1 tablet by mouth Every Other Day., Disp: 16 tablet, Rfl: 2    SUMAtriptan (Imitrex) 50 MG tablet, Take 1 tablet by mouth 1 (One) Time As Needed at onset of Migraine. May repeat dose one time in 2 hours if headache not relieved., Disp: 9 tablet, Rfl: 2  Medicines reviewed by Stoney Shaw RPH on 6/18/2024 at 10:51 AM  Medicines reviewed by Stoney Shaw RPH on 6/18/2024 at 10:51 AM    Drug Interactions  none     Adverse Drug Reactions  Medication tolerability: Tolerating with no to minimal ADRs  Medication plan: Continue therapy with normal follow-up  Plan for ADR Management: none    Hospitalizations and Urgent Care Since Last Assessment  Hospitalizations or Admissions: none  ED Visits: none  Urgent Office Visits: none     Adherence, Self-Administration, and Current Therapy Problems  Adherence related to the patient's specialty therapy was discussed with the patient. The Adherence segment of this outreach has been reviewed and updated.     Adherence Questions  Linked Medication(s) Assessed: Rimegepant Sulfate  On average, how many doses/injections does the patient miss per month?:  0  What are the identified reasons for non-adherence or missed doses? : no problems identified  What is the estimated medication adherence level?: %  Based on the patient/caregiver response and refill history, does this patient require an MTP to track adherence improvements?: no    Additional Barriers to Patient Self-Administration: none  Methods for Supporting Patient Self-Administration: none    Open Medication Therapy Problems  No medication therapy recommendations to display    Goals of Therapy  Goals related to the patient's specialty therapy were discussed with the patient. The Patient Goals segment of this outreach has been reviewed and updated.   Goals Addressed Today        Specialty Pharmacy General Goal      Reduce severity and frequency of migraines and headaches. Starting rimegepant as prevention and acute treatment per Dr. Aponte 12/27/23. Patient rates migraines as a 10/10 on a pain scale from 1-10 when most severe. Headaches can last 12 hours up to 6 days in duration. Headache frequency has been highly variable recently but has had daily headache symptoms for the last 10 days.                Progress Toward Meeting Patient-Identified Goals of Therapy: On Track  New Patient-Identified Goals, If Applicable:     Progress Toward Meeting Clinical Goals or Therapeutic Targets: On Track  New Clinical Goals or Therapeutic Targets, If Applicable:     Quality of Life Assessment   Quality of Life related to the patient's enrollment in the patient management program and services provided was discussed with the patient. The QOL segment of this outreach has been reviewed and updated.  Quality of Life Improvement Scale: 8-Moderately better    Reassessment Plan & Follow-Up  Medication Therapy Changes: none  Additional Plans, Therapy Recommendations, or Therapy Problems to Be Addressed: none   Pharmacist to perform regular reassessments no more than (6) months from the previous assessment.  Care Coordinator to  set up future refill outreaches, coordinate prescription delivery, and escalate clinical questions to pharmacist.     Attestation  I attest the patient was actively involved in and has agreed to the above plan of care. I attest that the specialty medication(s) addressed above are appropriate for the patient based on my reassessment. If the prescribed therapy is at any point deemed not appropriate based on the current or future assessments, a consultation will be initiated with the patient's specialty care provider to determine the best course of action. The revised plan of therapy will be documented along with any required assessments and/or additional patient education provided.     Electronically signed by Stoney Shaw RPH, 06/18/24, 10:55 AM EDT.

## 2024-07-02 ENCOUNTER — SPECIALTY PHARMACY (OUTPATIENT)
Dept: NEUROLOGY | Facility: CLINIC | Age: 39
End: 2024-07-02
Payer: COMMERCIAL

## 2024-07-02 NOTE — PROGRESS NOTES
Specialty Pharmacy Refill Coordination Note     Harinder is a 39 y.o. male contacted today regarding refills of  [facility] specialty medication(s).    Reviewed and verified with patient:       Specialty medication(s) and dose(s) confirmed: yes    Refill Questions      Flowsheet Row Most Recent Value   Changes to allergies? No   Changes to medications? No   New conditions or infections since last clinic visit No   Unplanned office visit, urgent care, ED, or hospital admission in the last 4 weeks  No   How does patient/caregiver feel medication is working? Very good   Financial problems or insurance changes  No   Since the previous refill, were any specialty medication doses or scheduled injections missed or delayed?  No   Does this patient require a clinical escalation to a pharmacist? No            Delivery Questions      Flowsheet Row Most Recent Value   Delivery method Beeline   Delivery address Home   Number of medications in delivery 1   Medication(s) being filled and delivered Rimegepant Sulfate   Doses left of specialty medications 5   Copay verified? Yes   Copay amount $0   Copay form of payment No copayment ($0)   Ship Date 7/2   Delivery Date 7/3   Signature Required No                   Follow-up: 21 day(s)     Anyi Watson, Pharmacy Technician  Specialty Pharmacy Technician

## 2024-07-26 ENCOUNTER — OFFICE VISIT (OUTPATIENT)
Dept: NEUROLOGY | Facility: CLINIC | Age: 39
End: 2024-07-26
Payer: COMMERCIAL

## 2024-07-26 VITALS
WEIGHT: 153 LBS | BODY MASS INDEX: 24.59 KG/M2 | HEART RATE: 72 BPM | DIASTOLIC BLOOD PRESSURE: 82 MMHG | SYSTOLIC BLOOD PRESSURE: 122 MMHG | HEIGHT: 66 IN

## 2024-07-26 DIAGNOSIS — H93.A3 PULSATILE TINNITUS OF BOTH EARS: Primary | ICD-10-CM

## 2024-07-26 DIAGNOSIS — G43.001 MIGRAINE WITHOUT AURA AND WITH STATUS MIGRAINOSUS, NOT INTRACTABLE: ICD-10-CM

## 2024-07-26 PROCEDURE — 99214 OFFICE O/P EST MOD 30 MIN: CPT | Performed by: PSYCHIATRY & NEUROLOGY

## 2024-07-26 RX ORDER — RIMEGEPANT SULFATE 75 MG/75MG
75 TABLET, ORALLY DISINTEGRATING ORAL EVERY OTHER DAY
Qty: 16 TABLET | Refills: 2 | Status: SHIPPED | OUTPATIENT
Start: 2024-07-26

## 2024-07-26 NOTE — ASSESSMENT & PLAN NOTE
He does report having some pulsatile tinnitus for which he was evaluated by ENT and no definitive abnormalities were noted and he has not had any relief from these symptoms which tend to come on in the mornings or wake him up in the middle of the night.  He had lab work in 5/2024 which demonstrated normal kidney function and mildly elevated ALT for which he reports cutting back on Tylenol more recently.   I will order a CTA of his head and neck for further evaluation of this pulsatile tinnitus to be sure it is not due to a vascular abnormality.

## 2024-07-26 NOTE — ASSESSMENT & PLAN NOTE
39 year old right handed man with migraines and more recent concern for pulsatile tinnitus.  He has had migraines since he was [age] years old.  The headaches are mostly in the front of his head with a throbbing quality which he rates as 10/10 on pain scale 1-10 when most severe with associated light and sound sensitivity along with nausea and vomiting.  Headaches can last 12 hours up to 6 days in duration.  He had the longest migraine in March 2022 for which he went to the ED and was prescribed sumatriptan 50 mg which he used and this got rid of his migraine.  He has completely cut out caffeine and Excedrin migraine.  There is family history of migraines in his sister.  He had a head CT scan done in 3/2015 which looks normal.  Stress and gluten tends to be known triggers for him.  Laying down in a cold and dark room tends to help with his migraines.  He denies any history of kidney stones or heart palpitations.  He does not have any auras with his migraines currently.  He returns today and tells me today is doing better with taking the Nurtec ODT every other day for migraine prevention.  He has used sumatriptan as needed rarely as well for breakthrough migraines.   He had a brain MRI scan on 2/1/2024 which was normal.  I will continue the Nurtec ODT every other day for prevention and acute treatment of migraines.

## 2024-07-26 NOTE — PROGRESS NOTES
Chief Complaint  Migraine (Nurtec preventative and sumatriptan prn- Doing really well- pulsing in ears (heart beat in his ears) -causing the HA- reports seeing ENT was told all is ok- happens when he gets too Hot--)    Subjective          Harinder Dale presents to [facility] for   HISTORY OF PRESENT ILLNESS:    Harinder Dale is a 39 year old right handed man who returns to neurology clinic for follow up evaluation and treatment of migraines and more recent concern for pulsatile tinnitus.  He has had migraines since he was 4 or 5 years old.  The headaches are mostly in the front of his head with a throbbing quality which he rates as 10/10 on pain scale 1-10 when most severe with associated light and sound sensitivity along with nausea and vomiting.  Headaches can last 12 hours up to 6 days in duration.  He had the longest migraine in March 2022 for which he went to the ED and was prescribed sumatriptan 50 mg which he used and this got rid of his migraine.  He has completely cut out caffeine and Excedrin migraine.  There is family history of migraines in his sister.  He had a head CT scan done in 3/2015 which looks normal.  Stress and gluten tends to be known triggers for him.  Laying down in a cold and dark room tends to help with his migraines.  He denies any history of kidney stones or heart palpitations.  He does not have any auras with his migraines currently.  He returns today and tells me today is doing better with taking the Nurtec ODT every other day for migraine prevention.  He has used sumatriptan as needed rarely as well for breakthrough migraines.   He had a brain MRI scan on 2/1/2024 which was normal.  He does report having some pulsatile tinnitus for which he was evaluated by ENT and no definitive abnormalities were noted and he has not had any relief from these symptoms which tend to come on in the mornings or wake him up in the middle of the night.  He had lab work in  "2024 which demonstrated normal kidney function and mildly elevated ALT for which he reports cutting back on Tylenol more recently.      Past Medical History:   Diagnosis Date    Difficulty walking     Migraine         Family History   Problem Relation Age of Onset    Migraines Sister         Social History     Socioeconomic History    Marital status: Single   Tobacco Use    Smoking status: Former     Current packs/day: 0.00     Types: Cigarettes     Quit date: 3/2/2016     Years since quittin.4    Smokeless tobacco: Never   Vaping Use    Vaping status: Never Used   Substance and Sexual Activity    Alcohol use: Not Currently    Sexual activity: Defer        I have reviewed and confirmed the accuracy of the ROS as documented by the MA/LPN/RN Dominic Aponte MD   Review of Systems   HENT:  Positive for ear pain, hearing loss (crackling sounding) and tinnitus.    Neurological:  Negative for dizziness, tremors, seizures, syncope, facial asymmetry, speech difficulty, weakness, light-headedness, numbness, headache, memory problem and confusion.   Psychiatric/Behavioral:  Negative for agitation, behavioral problems, decreased concentration, dysphoric mood, hallucinations, self-injury, sleep disturbance, suicidal ideas, negative for hyperactivity, depressed mood and stress. The patient is not nervous/anxious.         Objective   Vital Signs:   /82   Pulse 72   Ht 167.6 cm (65.98\")   Wt 69.4 kg (153 lb)   BMI 24.71 kg/m²       PHYSICAL EXAM:    General   Mental Status - Alert. General Appearance - Well developed, Well groomed, Oriented and Cooperative. Orientation - Oriented X3.       Head and Neck  Head - normocephalic, atraumatic with no lesions or palpable masses.  Neck    Global Assessment - supple.       Eye   Sclera/Conjunctiva - Bilateral - Normal.    ENMT  Mouth and Throat   Oral Cavity/Oropharynx: Oropharynx - the soft palate,uvula and tongue are normal in appearance.    Chest and Lung Exam   Chest - " lung clear to auscultation bilaterally.    Cardiovascular   Cardiovascular examination reveals  - normal heart sounds, regular rate and rhythm.    Neurologic   Mental Status: Speech - Normal. Cognitive function - appropriate fund of knowledge. No impairment of attention, Impairment of concentration, impairment of long term memory or impairment of short term memory.  Cranial Nerves:   II Optic: Visual acuity - Left - Normal. Right - Normal. Visual fields - Normal (to confrontation).  III Oculomotor: Pupillary constriction - Left - Normal. Right - Normal.  VII Facial: - Normal Bilaterally.   IX Glossopharyngeal / X Vagus - Normal.  XI Accessory: Trapezius - Bilateral - Normal. Sternocleidomastoid - Bilateral - Normal.  XII Hypoglossal - Bilateral - Normal.  Eye Movements: - Normal Bilaterally.  Sensory:   Light Touch: Intact - Globally.  Motor:   Bulk and Contour: - Normal.  Tone: - Normal.  Tremor: Not present.  Strength: 5/5 normal muscle strength - All Muscles.   General Assessment of Reflexes: - deep tendon reflexes are normal. Coordination - No Impairment of finger-to-nose or Impairment of rapid alternating movements. Gait - Normal.       Result Review :                 Assessment and Plan    Problem List Items Addressed This Visit          ENT    Pulsatile tinnitus of both ears - Primary    Current Assessment & Plan     He does report having some pulsatile tinnitus for which he was evaluated by ENT and no definitive abnormalities were noted and he has not had any relief from these symptoms which tend to come on in the mornings or wake him up in the middle of the night.  He had lab work in 5/2024 which demonstrated normal kidney function and mildly elevated ALT for which he reports cutting back on Tylenol more recently.   I will order a CTA of his head and neck for further evaluation of this pulsatile tinnitus to be sure it is not due to a vascular abnormality.           Relevant Orders    CT angiogram head    CT  Angiogram Neck With & Without Contrast       Neuro    Migraine without aura and with status migrainosus, not intractable    Current Assessment & Plan     39 year old right handed man with migraines and more recent concern for pulsatile tinnitus.  He has had migraines since he was 4 or 5 years old.  The headaches are mostly in the front of his head with a throbbing quality which he rates as 10/10 on pain scale 1-10 when most severe with associated light and sound sensitivity along with nausea and vomiting.  Headaches can last 12 hours up to 6 days in duration.  He had the longest migraine in March 2022 for which he went to the ED and was prescribed sumatriptan 50 mg which he used and this got rid of his migraine.  He has completely cut out caffeine and Excedrin migraine.  There is family history of migraines in his sister.  He had a head CT scan done in 3/2015 which looks normal.  Stress and gluten tends to be known triggers for him.  Laying down in a cold and dark room tends to help with his migraines.  He denies any history of kidney stones or heart palpitations.  He does not have any auras with his migraines currently.  He returns today and tells me today is doing better with taking the Nurtec ODT every other day for migraine prevention.  He has used sumatriptan as needed rarely as well for breakthrough migraines.   He had a brain MRI scan on 2/1/2024 which was normal.  I will continue the Nurtec ODT every other day for prevention and acute treatment of migraines.           Relevant Medications    Rimegepant Sulfate (Nurtec) 75 MG tablet dispersible tablet     Follow Up   Return in about 3 months (around 10/26/2024).  Patient was given instructions and counseling regarding his condition or for health maintenance advice. Please see specific information pulled into the AVS if appropriate.

## 2024-07-29 ENCOUNTER — SPECIALTY PHARMACY (OUTPATIENT)
Dept: NEUROLOGY | Facility: CLINIC | Age: 39
End: 2024-07-29
Payer: COMMERCIAL

## 2024-07-29 NOTE — PROGRESS NOTES
Specialty Pharmacy Refill Coordination Note     Harinder is a 39 y.o. male contacted today regarding refills of  [facility] specialty medication(s).    Reviewed and verified with patient:       Specialty medication(s) and dose(s) confirmed: yes    Refill Questions      Flowsheet Row Most Recent Value   Changes to allergies? No   Changes to medications? No   New conditions or infections since last clinic visit No   Unplanned office visit, urgent care, ED, or hospital admission in the last 4 weeks  No   How does patient/caregiver feel medication is working? Very good   Financial problems or insurance changes  No   Since the previous refill, were any specialty medication doses or scheduled injections missed or delayed?  No   Does this patient require a clinical escalation to a pharmacist? No            Delivery Questions      Flowsheet Row Most Recent Value   Delivery method Beeline   Delivery address verified with patient/caregiver? Yes   Delivery address Home   Number of medications in delivery 1   Medication(s) being filled and delivered Rimegepant Sulfate   Doses left of specialty medications 5   Copay verified? Yes   Copay amount $0   Copay form of payment No copayment ($0)   Ship Date 7/29   Delivery Date 7/30   Signature Required No                   Follow-up: 21 day(s)     Anyi Watson, Pharmacy Technician  Specialty Pharmacy Technician

## 2024-08-15 ENCOUNTER — HOSPITAL ENCOUNTER (OUTPATIENT)
Dept: CT IMAGING | Facility: HOSPITAL | Age: 39
Discharge: HOME | End: 2024-08-15
Admitting: PSYCHIATRY & NEUROLOGY
Payer: COMMERCIAL

## 2024-08-15 DIAGNOSIS — H93.A3 PULSATILE TINNITUS OF BOTH EARS: ICD-10-CM

## 2024-08-15 PROCEDURE — 70498 CT ANGIOGRAPHY NECK: CPT

## 2024-08-15 PROCEDURE — 70496 CT ANGIOGRAPHY HEAD: CPT

## 2024-08-15 PROCEDURE — 25510000001 IOPAMIDOL PER 1 ML: Performed by: PSYCHIATRY & NEUROLOGY

## 2024-08-15 RX ADMIN — IOPAMIDOL 95 ML: 755 INJECTION, SOLUTION INTRAVENOUS at 16:34

## 2024-08-23 ENCOUNTER — TELEPHONE (OUTPATIENT)
Dept: NEUROLOGY | Facility: CLINIC | Age: 39
End: 2024-08-23

## 2024-08-27 ENCOUNTER — SPECIALTY PHARMACY (OUTPATIENT)
Dept: NEUROLOGY | Facility: CLINIC | Age: 39
End: 2024-08-27
Payer: COMMERCIAL

## 2024-08-27 NOTE — PROGRESS NOTES
Specialty Pharmacy Refill Coordination Note     Harinder is a 39 y.o. male contacted today regarding refills of [facility] specialty medication(s).    Reviewed and verified with patient:       Specialty medication(s) and dose(s) confirmed: yes    Refill Questions      Flowsheet Row Most Recent Value   Changes to allergies? No   Changes to medications? No   New conditions or infections since last clinic visit No   Unplanned office visit, urgent care, ED, or hospital admission in the last 4 weeks  No   How does patient/caregiver feel medication is working? Very good   Financial problems or insurance changes  No   Since the previous refill, were any specialty medication doses or scheduled injections missed or delayed?  No   Does this patient require a clinical escalation to a pharmacist? No            Delivery Questions      Flowsheet Row Most Recent Value   Delivery method UPS   Delivery address verified with patient/caregiver? Yes   Delivery address Home   Number of medications in delivery 1   Medication(s) being filled and delivered Rimegepant Sulfate   Doses left of specialty medications 6   Copay verified? Yes   Copay amount $0   Copay form of payment No copayment ($0)   Ship Date 8/27   Delivery Date 8/28   Signature Required No                   Follow-up: 21 day(s)     Anyi Watson, Pharmacy Technician  Specialty Pharmacy Technician

## 2024-09-10 ENCOUNTER — SPECIALTY PHARMACY (OUTPATIENT)
Dept: NEUROLOGY | Facility: CLINIC | Age: 39
End: 2024-09-10
Payer: COMMERCIAL

## 2024-09-23 ENCOUNTER — SPECIALTY PHARMACY (OUTPATIENT)
Dept: NEUROLOGY | Facility: CLINIC | Age: 39
End: 2024-09-23
Payer: COMMERCIAL

## 2024-10-22 ENCOUNTER — SPECIALTY PHARMACY (OUTPATIENT)
Dept: NEUROLOGY | Facility: CLINIC | Age: 39
End: 2024-10-22
Payer: COMMERCIAL

## 2024-10-22 NOTE — PROGRESS NOTES
Specialty Pharmacy Refill Coordination Note     Harinder is a 39 y.o. male contacted today regarding refills of Nurtec specialty medication(s).    Reviewed and verified with patient:       Specialty medication(s) and dose(s) confirmed: yes    Refill Questions      Flowsheet Row Most Recent Value   Changes to allergies? No   Changes to medications? No   New conditions or infections since last clinic visit No   Unplanned office visit, urgent care, ED, or hospital admission in the last 4 weeks  No   How does patient/caregiver feel medication is working? Very good   Financial problems or insurance changes  No   Since the previous refill, were any specialty medication doses or scheduled injections missed or delayed?  No   Does this patient require a clinical escalation to a pharmacist? No            Delivery Questions      Flowsheet Row Most Recent Value   Delivery method UPS   Delivery address verified with patient/caregiver? Yes   Delivery address Home   Number of medications in delivery 1   Medication(s) being filled and delivered Rimegepant Sulfate (Nurtec)   Doses left of specialty medications 5   Copay verified? Yes   Copay amount $0   Copay form of payment No copayment ($0)   Ship Date 10/22   Delivery Date 10/23   Signature Required No                   Follow-up: 21 day(s)     Anyi Watson, Pharmacy Technician  Specialty Pharmacy Technician

## 2024-10-29 ENCOUNTER — OFFICE VISIT (OUTPATIENT)
Dept: NEUROLOGY | Facility: CLINIC | Age: 39
End: 2024-10-29
Payer: COMMERCIAL

## 2024-10-29 VITALS
OXYGEN SATURATION: 98 % | SYSTOLIC BLOOD PRESSURE: 122 MMHG | DIASTOLIC BLOOD PRESSURE: 86 MMHG | WEIGHT: 167 LBS | HEIGHT: 66 IN | HEART RATE: 72 BPM | BODY MASS INDEX: 26.84 KG/M2

## 2024-10-29 DIAGNOSIS — H93.A3 PULSATILE TINNITUS OF BOTH EARS: ICD-10-CM

## 2024-10-29 DIAGNOSIS — G43.001 MIGRAINE WITHOUT AURA AND WITH STATUS MIGRAINOSUS, NOT INTRACTABLE: Primary | ICD-10-CM

## 2024-10-29 PROCEDURE — 99213 OFFICE O/P EST LOW 20 MIN: CPT | Performed by: PSYCHIATRY & NEUROLOGY

## 2024-10-29 RX ORDER — SUMATRIPTAN 50 MG/1
50 TABLET, FILM COATED ORAL ONCE AS NEEDED
Qty: 9 TABLET | Refills: 5 | Status: SHIPPED | OUTPATIENT
Start: 2024-10-29

## 2024-10-29 RX ORDER — SUMATRIPTAN 50 MG/1
50 TABLET, FILM COATED ORAL ONCE AS NEEDED
Qty: 9 TABLET | Refills: 2 | Status: SHIPPED | OUTPATIENT
Start: 2024-10-29 | End: 2024-10-29

## 2024-10-29 NOTE — ASSESSMENT & PLAN NOTE
39 year old right handed man who returns to neurology clinic for follow up evaluation and treatment of migraines and concern for pulsatile tinnitus without an underlying discovered etiology and normal more recent CTA of his head and neck.  He has had migraines since he was 4 or 5 years old.  The headaches are mostly in the front of his head with a throbbing quality which he rates as 10/10 on pain scale 1-10 when most severe with associated light and sound sensitivity along with nausea and vomiting.  Headaches can last 12 hours up to 6 days in duration.  He had the longest migraine in March 2022 for which he went to the ED and was prescribed sumatriptan 50 mg which he used and this got rid of his migraine.  He has completely cut out caffeine and Excedrin migraine.  There is family history of migraines in his sister.  He had a head CT scan done in 3/2015 which looks normal.  He also had more recent CTA of his head and neck performed on 8/15/2024 which were both normal.  Stress and gluten tends to be known triggers for him.  Laying down in a cold and dark room tends to help with his migraines.  He denies any history of kidney stones or heart palpitations.  He does not have any auras with his migraines currently.  He returns today and tells me today is doing better with taking the Nurtec ODT every other day for migraine prevention.  He has used sumatriptan as needed rarely as well for breakthrough migraines.   He had a brain MRI scan on 2/1/2024 which was normal.  He does report having some pulsatile tinnitus for which he was evaluated by ENT and no definitive abnormalities were noted and again more recent CTA of his head and neck which are normal as well which I informed him today.  He had lab work in 5/2024 which demonstrated normal kidney function and mildly elevated ALT for which he reports cutting back on Tylenol more recently and he will have this rechecked with his PCP.  He does not think the Nurtec ODT was  helpful and given normal CTA of head and neck I will start him back on the sumatriptan which was helpful for acute treatment of his migraines.

## 2024-10-29 NOTE — ASSESSMENT & PLAN NOTE
CTA of head and neck were negative for underlying etiology.  I advised him to follow up with ENT as well.

## 2024-10-29 NOTE — PROGRESS NOTES
"Chief Complaint  Migraine (Follow up CT 08/15/2024 - Still having ringing in ears when he wakes up, describes the sound as \"wooshing\" - has had a lot of headaches but they have not turned into migraines. Past month he has been headache free. )    Subjective          Harinder Dale presents to [facility] for   HISTORY OF PRESENT ILLNESS:    Harinder Dale is a 39 year old right handed man who returns to neurology clinic for follow up evaluation and treatment of migraines and concern for pulsatile tinnitus without an underlying discovered etiology and normal more recent CTA of his head and neck.  He has had migraines since he was 4 or 5 years old.  The headaches are mostly in the front of his head with a throbbing quality which he rates as 10/10 on pain scale 1-10 when most severe with associated light and sound sensitivity along with nausea and vomiting.  Headaches can last 12 hours up to 6 days in duration.  He had the longest migraine in March 2022 for which he went to the ED and was prescribed sumatriptan 50 mg which he used and this got rid of his migraine.  He has completely cut out caffeine and Excedrin migraine.  There is family history of migraines in his sister.  He had a head CT scan done in 3/2015 which looks normal.  He also had more recent CTA of his head and neck performed on 8/15/2024 which were both normal.  Stress and gluten tends to be known triggers for him.  Laying down in a cold and dark room tends to help with his migraines.  He denies any history of kidney stones or heart palpitations.  He does not have any auras with his migraines currently.  He returns today and tells me today is doing better with taking the Nurtec ODT every other day for migraine prevention.  He has used sumatriptan as needed rarely as well for breakthrough migraines.   He had a brain MRI scan on 2/1/2024 which was normal.  He does report having some pulsatile tinnitus for which he was " "evaluated by ENT and no definitive abnormalities were noted and again more recent CTA of his head and neck which are normal as well which I informed him today.  He had lab work in 2024 which demonstrated normal kidney function and mildly elevated ALT for which he reports cutting back on Tylenol more recently and he will have this rechecked with his PCP.      Past Medical History:   Diagnosis Date    Difficulty walking     Migraine         Family History   Problem Relation Age of Onset    Migraines Sister         Social History     Socioeconomic History    Marital status: Single   Tobacco Use    Smoking status: Former     Current packs/day: 0.00     Types: Cigarettes     Quit date: 3/2/2016     Years since quittin.6    Smokeless tobacco: Never   Vaping Use    Vaping status: Never Used   Substance and Sexual Activity    Alcohol use: Not Currently    Sexual activity: Defer        I have reviewed and confirmed the accuracy of the ROS as documented by the MA/LPN/RN Dominic Aponte MD   Review of Systems   Neurological:  Positive for headache. Negative for dizziness, tremors, seizures, syncope, facial asymmetry, speech difficulty, weakness, light-headedness, numbness, memory problem and confusion.   Psychiatric/Behavioral:  Negative for agitation, behavioral problems, decreased concentration, dysphoric mood, hallucinations, self-injury, sleep disturbance, suicidal ideas, negative for hyperactivity, depressed mood and stress. The patient is not nervous/anxious.         Objective   Vital Signs:   /86   Pulse 72   Ht 167.6 cm (65.98\")   Wt 75.8 kg (167 lb)   SpO2 98%   BMI 26.97 kg/m²       PHYSICAL EXAM:    General   Mental Status - Alert. General Appearance - Well developed, Well groomed, Oriented and Cooperative. Orientation - Oriented X3.       Head and Neck  Head - normocephalic, atraumatic with no lesions or palpable masses.  Neck    Global Assessment - supple.       Eye   Sclera/Conjunctiva - " Bilateral - Normal.    ENMT  Mouth and Throat   Oral Cavity/Oropharynx: Oropharynx - the soft palate,uvula and tongue are normal in appearance.    Chest and Lung Exam   Chest - lung clear to auscultation bilaterally.    Cardiovascular   Cardiovascular examination reveals  - normal heart sounds, regular rate and rhythm.    Neurologic   Mental Status: Speech - Normal. Cognitive function - appropriate fund of knowledge. No impairment of attention, Impairment of concentration, impairment of long term memory or impairment of short term memory.  Cranial Nerves:   II Optic: Visual acuity - Left - Normal. Right - Normal. Visual fields - Normal (to confrontation).  III Oculomotor: Pupillary constriction - Left - Normal. Right - Normal.  VII Facial: - Normal Bilaterally.   IX Glossopharyngeal / X Vagus - Normal.  XI Accessory: Trapezius - Bilateral - Normal. Sternocleidomastoid - Bilateral - Normal.  XII Hypoglossal - Bilateral - Normal.  Eye Movements: - Normal Bilaterally.  Sensory:   Light Touch: Intact - Globally.  Motor:   Bulk and Contour: - Normal.  Tone: - Normal.  Tremor: Not present.  Strength: 5/5 normal muscle strength - All Muscles.   General Assessment of Reflexes: - deep tendon reflexes are normal. Coordination - No Impairment of finger-to-nose or Impairment of rapid alternating movements. Gait - Normal.         Result Review :                 Assessment and Plan    Problem List Items Addressed This Visit       Migraine without aura and with status migrainosus, not intractable - Primary    Current Assessment & Plan     39 year old right handed man who returns to neurology clinic for follow up evaluation and treatment of migraines and concern for pulsatile tinnitus without an underlying discovered etiology and normal more recent CTA of his head and neck.  He has had migraines since he was 4 or 5 years old.  The headaches are mostly in the front of his head with a throbbing quality which he rates as 10/10 on pain  scale 1-10 when most severe with associated light and sound sensitivity along with nausea and vomiting.  Headaches can last 12 hours up to 6 days in duration.  He had the longest migraine in March 2022 for which he went to the ED and was prescribed sumatriptan 50 mg which he used and this got rid of his migraine.  He has completely cut out caffeine and Excedrin migraine.  There is family history of migraines in his sister.  He had a head CT scan done in 3/2015 which looks normal.  He also had more recent CTA of his head and neck performed on 8/15/2024 which were both normal.  Stress and gluten tends to be known triggers for him.  Laying down in a cold and dark room tends to help with his migraines.  He denies any history of kidney stones or heart palpitations.  He does not have any auras with his migraines currently.  He returns today and tells me today is doing better with taking the Nurtec ODT every other day for migraine prevention.  He has used sumatriptan as needed rarely as well for breakthrough migraines.   He had a brain MRI scan on 2/1/2024 which was normal.  He does report having some pulsatile tinnitus for which he was evaluated by ENT and no definitive abnormalities were noted and again more recent CTA of his head and neck which are normal as well which I informed him today.  He had lab work in 5/2024 which demonstrated normal kidney function and mildly elevated ALT for which he reports cutting back on Tylenol more recently and he will have this rechecked with his PCP.  He does not think the Nurtec ODT was helpful and given normal CTA of head and neck I will start him back on the sumatriptan which was helpful for acute treatment of his migraines.           Relevant Medications    SUMAtriptan (Imitrex) 50 MG tablet    Pulsatile tinnitus of both ears    Current Assessment & Plan     CTA of head and neck were negative for underlying etiology.  I advised him to follow up with ENT as well.              Follow  Up   Return in about 6 months (around 4/29/2025).  Patient was given instructions and counseling regarding his condition or for health maintenance advice. Please see specific information pulled into the AVS if appropriate.

## 2024-11-13 DIAGNOSIS — G43.001 MIGRAINE WITHOUT AURA AND WITH STATUS MIGRAINOSUS, NOT INTRACTABLE: ICD-10-CM

## 2024-11-13 RX ORDER — RIMEGEPANT SULFATE 75 MG/75MG
75 TABLET, ORALLY DISINTEGRATING ORAL EVERY OTHER DAY
Qty: 16 TABLET | Refills: 2 | Status: SHIPPED | OUTPATIENT
Start: 2024-11-13

## 2024-11-21 ENCOUNTER — TELEPHONE (OUTPATIENT)
Dept: NEUROLOGY | Facility: CLINIC | Age: 39
End: 2024-11-21
Payer: COMMERCIAL

## 2024-11-21 NOTE — TELEPHONE ENCOUNTER
Left pt vm on 11/21/2024 advising provider can see him tomorrow at 2 pm if he would like to come in for a follow up for medication.

## 2024-11-22 ENCOUNTER — OFFICE VISIT (OUTPATIENT)
Dept: NEUROLOGY | Facility: CLINIC | Age: 39
End: 2024-11-22
Payer: COMMERCIAL

## 2024-11-22 VITALS
DIASTOLIC BLOOD PRESSURE: 88 MMHG | SYSTOLIC BLOOD PRESSURE: 130 MMHG | HEART RATE: 75 BPM | HEIGHT: 66 IN | OXYGEN SATURATION: 96 % | WEIGHT: 169 LBS | BODY MASS INDEX: 27.16 KG/M2

## 2024-11-22 DIAGNOSIS — H93.A3 PULSATILE TINNITUS OF BOTH EARS: ICD-10-CM

## 2024-11-22 DIAGNOSIS — G43.001 MIGRAINE WITHOUT AURA AND WITH STATUS MIGRAINOSUS, NOT INTRACTABLE: Primary | ICD-10-CM

## 2024-11-22 PROCEDURE — 99214 OFFICE O/P EST MOD 30 MIN: CPT | Performed by: PSYCHIATRY & NEUROLOGY

## 2024-11-22 RX ORDER — RIMEGEPANT SULFATE 75 MG/75MG
75 TABLET, ORALLY DISINTEGRATING ORAL EVERY OTHER DAY
Start: 2024-11-22

## 2024-11-22 NOTE — PROGRESS NOTES
Chief Complaint  Migraine (Was just in on the 29th, wants to discuss the Nurtec)    Subjective          Harinder Dale presents to [facility] NEUROLOGY for   HISTORY OF PRESENT ILLNESS:    Harinder Dale is a 39 year old right handed man who returns to neurology clinic for follow up evaluation and treatment of migraines and previous concern for pulsatile tinnitus without an underlying discovered etiology and normal more recent CTA of his head and neck.  He has had migraines since he was 4 or 5 years old.  The headaches are mostly in the front of his head with a throbbing quality which he rates as 10/10 on pain scale 1-10 when most severe with associated light and sound sensitivity along with nausea and vomiting.  Headaches can last 12 hours up to 6 days in duration.  He had the longest migraine in March 2022 for which he went to the ED and was prescribed sumatriptan 50 mg which he used and this got rid of his migraine.  He has completely cut out caffeine and Excedrin migraine.  There is family history of migraines in his sister.  He had a head CT scan done in 3/2015 which looks normal.  He also had more recent CTA of his head and neck performed on 8/15/2024 which were both normal.  Stress and gluten tends to be known triggers for him.  Laying down in a cold and dark room tends to help with his migraines.  He denies any history of kidney stones or heart palpitations.  He does not have any auras with his migraines currently.  He was doing better with taking the Nurtec ODT every other day for migraine prevention but tells me this has been very expensive most recently but this should improve with new year.  He has used sumatriptan as needed rarely as well for breakthrough migraines which does help, rizatriptan was not helpful.   He had a brain MRI scan on 2/1/2024 which was normal.  He does report having some pulsatile tinnitus for which he was evaluated by ENT and no definitive abnormalities were  noted and again more recent CTA of his head and neck which are normal as well which I informed him today.  He had lab work in 2024 which demonstrated normal kidney function and mildly elevated ALT for which he reports cutting back on Tylenol more recently and he will have this rechecked with his PCP.  He reports mainly on the days he takes Nurtec ODT he has lower abdominal pain which is minor and some difficulty with urination.  I have not come across this in the past but will check with our clinical pharmacists if this is a known side effect of this medicine.  The medicine is working great otherwise for his migraines which is why he is reluctant to switch necessarily if not related to this medicine.  I will also have him follow up with his PCP as well to be sure it is not prostate related or something else.     Past Medical History:   Diagnosis Date    Difficulty walking     Migraine         Family History   Problem Relation Age of Onset    Migraines Sister         Social History     Socioeconomic History    Marital status: Single   Tobacco Use    Smoking status: Former     Current packs/day: 0.00     Types: Cigarettes     Quit date: 3/2/2016     Years since quittin.7    Smokeless tobacco: Never   Vaping Use    Vaping status: Never Used   Substance and Sexual Activity    Alcohol use: Not Currently    Sexual activity: Defer        I have reviewed and confirmed the accuracy of the ROS as documented by the MA/LPN/RN Dominic Aponte MD   Review of Systems   Neurological:  Positive for headache. Negative for dizziness, tremors, seizures, syncope, facial asymmetry, speech difficulty, weakness, light-headedness, numbness, memory problem and confusion.   Psychiatric/Behavioral:  Negative for agitation, behavioral problems, decreased concentration, dysphoric mood, hallucinations, self-injury, sleep disturbance, suicidal ideas, negative for hyperactivity, depressed mood and stress. The patient is not nervous/anxious.   "       Objective   Vital Signs:   /88   Pulse 75   Ht 167.6 cm (65.98\")   Wt 76.7 kg (169 lb)   SpO2 96%   BMI 27.29 kg/m²       PHYSICAL EXAM:    General   Mental Status - Alert. General Appearance - Well developed, Well groomed, Oriented and Cooperative. Orientation - Oriented X3.       Head and Neck  Head - normocephalic, atraumatic with no lesions or palpable masses.  Neck    Global Assessment - supple.       Eye   Sclera/Conjunctiva - Bilateral - Normal.    ENMT  Mouth and Throat   Oral Cavity/Oropharynx: Oropharynx - the soft palate,uvula and tongue are normal in appearance.    Chest and Lung Exam   Chest - lung clear to auscultation bilaterally.    Cardiovascular   Cardiovascular examination reveals  - normal heart sounds, regular rate and rhythm.    Neurologic   Mental Status: Speech - Normal. Cognitive function - appropriate fund of knowledge. No impairment of attention, Impairment of concentration, impairment of long term memory or impairment of short term memory.  Cranial Nerves:   II Optic: Visual acuity - Left - Normal. Right - Normal. Visual fields - Normal (to confrontation).  III Oculomotor: Pupillary constriction - Left - Normal. Right - Normal.  VII Facial: - Normal Bilaterally.   IX Glossopharyngeal / X Vagus - Normal.  XI Accessory: Trapezius - Bilateral - Normal. Sternocleidomastoid - Bilateral - Normal.  XII Hypoglossal - Bilateral - Normal.  Eye Movements: - Normal Bilaterally.  Sensory:   Light Touch: Intact - Globally.  Motor:   Bulk and Contour: - Normal.  Tone: - Normal.  Tremor: Not present.  Strength: 5/5 normal muscle strength - All Muscles.                                                        General Assessment of Reflexes: - deep tendon reflexes are normal. Coordination - No Impairment of finger-to-nose or Impairment of rapid alternating movements. Gait - Broad based, right knee is fused.        Result Review :                 Assessment and Plan    Problem List Items " Addressed This Visit       Migraine without aura and with status migrainosus, not intractable - Primary    Current Assessment & Plan     39 year old right handed man with migraines with potential side effect to Nurtec ODT (though unsure) and previous concern for pulsatile tinnitus without an underlying discovered etiology and normal more recent CTA of his head and neck.  He has had migraines since he was 4 or 5 years old.  The headaches are mostly in the front of his head with a throbbing quality which he rates as 10/10 on pain scale 1-10 when most severe with associated light and sound sensitivity along with nausea and vomiting.  Headaches can last 12 hours up to 6 days in duration.  He had the longest migraine in March 2022 for which he went to the ED and was prescribed sumatriptan 50 mg which he used and this got rid of his migraine.  He has completely cut out caffeine and Excedrin migraine.  There is family history of migraines in his sister.  He had a head CT scan done in 3/2015 which looks normal.  He also had more recent CTA of his head and neck performed on 8/15/2024 which were both normal.  Stress and gluten tends to be known triggers for him.  Laying down in a cold and dark room tends to help with his migraines.  He denies any history of kidney stones or heart palpitations.  He does not have any auras with his migraines currently.  He was doing better with taking the Nurtec ODT every other day for migraine prevention but tells me this has been very expensive most recently but this should improve with new year.  He has used sumatriptan as needed rarely as well for breakthrough migraines which does help, rizatriptan was not helpful.   He had a brain MRI scan on 2/1/2024 which was normal.  He does report having some pulsatile tinnitus for which he was evaluated by ENT and no definitive abnormalities were noted and again more recent CTA of his head and neck which are normal as well which I informed him today.   He had lab work in 5/2024 which demonstrated normal kidney function and mildly elevated ALT for which he reports cutting back on Tylenol more recently and he will have this rechecked with his PCP.  He reports mainly on the days he takes Nurtec ODT he has lower abdominal pain which is minor and some difficulty with urination.  I have not come across this in the past but will check with our clinical pharmacists if this is a known side effect of this medicine.  The medicine is working great otherwise for his migraines which is why he is reluctant to switch necessarily if not related to this medicine.  I will also have him follow up with his PCP as well to be sure it is not prostate related or something else.  I will provide him with some samples of Nurtec ODT to continue him until the new year as this medicine is helpful.  Discussed migraine triggers and lifestyle modifications.           Relevant Medications    Rimegepant Sulfate (Nurtec) 75 MG tablet dispersible tablet    Pulsatile tinnitus of both ears    Current Assessment & Plan     CTA of head and neck was negative.  He will follow up with ENT.           I spent 31 minutes of time in patient' scare today discussing potential side effect of Nurtec ODT and treatment options and also communicating with clinical pharmacist and PCP and answering patient's questions  .    Follow Up   Return in about 3 months (around 2/22/2025).  Patient was given instructions and counseling regarding his condition or for health maintenance advice. Please see specific information pulled into the AVS if appropriate.

## 2024-11-22 NOTE — ASSESSMENT & PLAN NOTE
39 year old right handed man with migraines with potential side effect to Nurtec ODT (though unsure) and previous concern for pulsatile tinnitus without an underlying discovered etiology and normal more recent CTA of his head and neck.  He has had migraines since he was 4 or 5 years old.  The headaches are mostly in the front of his head with a throbbing quality which he rates as 10/10 on pain scale 1-10 when most severe with associated light and sound sensitivity along with nausea and vomiting.  Headaches can last 12 hours up to 6 days in duration.  He had the longest migraine in March 2022 for which he went to the ED and was prescribed sumatriptan 50 mg which he used and this got rid of his migraine.  He has completely cut out caffeine and Excedrin migraine.  There is family history of migraines in his sister.  He had a head CT scan done in 3/2015 which looks normal.  He also had more recent CTA of his head and neck performed on 8/15/2024 which were both normal.  Stress and gluten tends to be known triggers for him.  Laying down in a cold and dark room tends to help with his migraines.  He denies any history of kidney stones or heart palpitations.  He does not have any auras with his migraines currently.  He was doing better with taking the Nurtec ODT every other day for migraine prevention but tells me this has been very expensive most recently but this should improve with new year.  He has used sumatriptan as needed rarely as well for breakthrough migraines which does help, rizatriptan was not helpful.   He had a brain MRI scan on 2/1/2024 which was normal.  He does report having some pulsatile tinnitus for which he was evaluated by ENT and no definitive abnormalities were noted and again more recent CTA of his head and neck which are normal as well which I informed him today.  He had lab work in 5/2024 which demonstrated normal kidney function and mildly elevated ALT for which he reports cutting back on Tylenol  more recently and he will have this rechecked with his PCP.  He reports mainly on the days he takes Nurtec ODT he has lower abdominal pain which is minor and some difficulty with urination.  I have not come across this in the past but will check with our clinical pharmacists if this is a known side effect of this medicine.  The medicine is working great otherwise for his migraines which is why he is reluctant to switch necessarily if not related to this medicine.  I will also have him follow up with his PCP as well to be sure it is not prostate related or something else.  I will provide him with some samples of Nurtec ODT to continue him until the new year as this medicine is helpful.  Discussed migraine triggers and lifestyle modifications.

## 2024-11-27 ENCOUNTER — TELEPHONE (OUTPATIENT)
Dept: FAMILY MEDICINE CLINIC | Facility: CLINIC | Age: 39
End: 2024-11-27
Payer: COMMERCIAL

## 2024-11-27 NOTE — TELEPHONE ENCOUNTER
Pt called and is having abdominal pain, and trouble urinating. I told pt to go to urgent care or er.

## 2024-12-06 ENCOUNTER — SPECIALTY PHARMACY (OUTPATIENT)
Dept: NEUROLOGY | Facility: CLINIC | Age: 39
End: 2024-12-06
Payer: COMMERCIAL

## 2024-12-06 NOTE — PROGRESS NOTES
Specialty Pharmacy Patient Management Program  Neurology Reassessment     Harinder Dale is a 39 y.o. male with chronic migraine seen by a Neurology provider and enrolled in the Neurology Patient Management program offered by [facility].  A follow-up outreach was conducted, including assessment of continued therapy appropriateness, medication adherence, and side effect incidence and management for rimegepant (Nurtec).     Changes to Insurance Coverage or Financial Support  No changes.       Relevant Past Medical History and Comorbidities  Relevant medical history and concomitant health conditions were discussed with the patient. The patient's chart has been reviewed for relevant past medical history and comorbid health conditions and updated as necessary.   Past Medical History:   Diagnosis Date    Difficulty walking     Migraine      Social History     Socioeconomic History    Marital status: Single   Tobacco Use    Smoking status: Former     Current packs/day: 0.00     Types: Cigarettes     Quit date: 3/2/2016     Years since quittin.7    Smokeless tobacco: Never   Vaping Use    Vaping status: Never Used   Substance and Sexual Activity    Alcohol use: Not Currently    Sexual activity: Defer     Problem list reviewed by Jluis Blunt RPH on 2024 at  8:50 AM      Hospitalizations and Urgent Care Since Last Assessment  ED Visits, Admissions, or Hospitalizations: none.   Urgent Office Visits: none.       Allergies  Known allergies and reactions were discussed with the patient. The patient's chart has been reviewed for allergy information and updated as necessary.   No Known Allergies  Allergies reviewed by Jluis Blunt RPH on 2024 at  8:50 AM      Relevant Laboratory Values  Common labs          2024    08:05   Common Labs   Glucose 87    BUN 14    Creatinine 1.10    Sodium 139    Potassium 4.7    Chloride 101    Calcium 9.7    Total Protein 7.1    Albumin 4.6     Total Bilirubin 0.6    Alkaline Phosphatase 66    AST (SGOT) 34    ALT (SGPT) 44    WBC 4.49    Hemoglobin 16.5    Hematocrit 50.2    Platelets 216    Total Cholesterol 224    Triglycerides 101    HDL Cholesterol 53    LDL Cholesterol  153        Lab Assessment  The above labs have been reviewed. No dose adjustments are needed for the specialty medication(s) based on the labs.       Current Medication List  This medication list has been reviewed with the patient and evaluated for any interactions or necessary modifications/recommendations, and updated to include all prescription medications, OTC medications, and supplements the patient is currently taking.  This list reflects what is contained in the patient's profile, which has also been marked as reviewed to communicate to other providers it is the most up to date version of the patient's current medication therapy.     Current Outpatient Medications:     ibuprofen (ADVIL,MOTRIN) 400 MG tablet, Take 1 tablet by mouth., Disp: , Rfl:     Rimegepant Sulfate (Nurtec) 75 MG tablet dispersible tablet, Take 1 tablet by mouth Every Other Day., Disp: , Rfl:     SUMAtriptan (Imitrex) 50 MG tablet, Take 1 tablet by mouth 1 (One) Time As Needed at onset of Migraine. May repeat dose one time in 2 hours if headache not relieved., Disp: 9 tablet, Rfl: 5    Medicines reviewed by Jluis Blunt [facility] on 12/6/2024 at  8:50 AM    Drug Interactions  None.       Adverse Drug Reactions  Medication tolerability: Tolerating with no to minimal ADRs  Medication plan: Continue therapy with normal follow-up  Plan for ADR Management: N/A, no ADR's      Adherence, Self-Administration, and Current Therapy Problems  Adherence related patient's specialty therapy was discussed with the patient. The Adherence segment of this outreach has been reviewed and updated.   Adherence Questions  Linked Medication(s) Assessed: Rimegepant Sulfate (Nurtec)  On average, how many doses/injections does the  patient miss per month?: 0 (Patient's copay card at maximum leaving pt with ~$600 copay and per his neurologist, they are coordinating samples to get patient covered through December and into Jan 2025.)  What are the identified reasons for non-adherence or missed doses? : no problems identified  What is the estimated medication adherence level?: %  Based on the patient/caregiver response and refill history, does this patient require an MTP to track adherence improvements?: no    Additional Barriers to Patient Self-Administration: no barriers.  Methods for Supporting Patient Self-Administration: no further support needed at this time.       Recently Close Medication Therapy Problems  No medication therapy recommendations to display  Open Medication Therapy Problems  No medication therapy recommendations to display     Goals of Therapy  Goals related to the patient's specialty therapy was discussed with the patient. The Patient Goals segment of this outreach has been reviewed and updated.    Goals Addressed Today        Specialty Pharmacy General Goal      Reduce severity and frequency of migraines and headaches. Starting rimegepant as prevention and acute treatment per Dr. Aponte 12/27/23. Patient rates migraines as a 10/10 on a pain scale from 1-10 when most severe. Headaches can last 12 hours up to 6 days in duration. Headache frequency has been highly variable recently but has had daily headache symptoms for the last 10 days.      12/6/24: Patient states rimegepant continues to work very well and with about ~8-10 headache days per month but highly variable month to month. He reports he's been sick the last week and has had some continuous headache symptoms with this but attributes it entirely to illness. Migraine severity reduction is about ~25-50% per patient.   6/18/24: Patient with about ~10-15 headaches per month on rimegepant dosed every other day, which patient states is a significant decrease. Mild  reduction in migraine severity of maybe ~25%. Patient content with therapy, no side effects or concerns at this time.                Quality of Life Assessment   Quality of Life related to the patient's enrollment in the patient management program and services provided was discussed with the patient. The QOL segment of this outreach has been reviewed and updated.   Quality of Life Improvement Scale: 8-Moderately better      Reassessment Plan & Follow-Up  Medication Therapy Changes: No changes, continuing rimegepant for prevention and sumatriptan for acute treatment of migraines per patient's neurologist.  Related Plans, Therapy Recommendations, or Issues to Be Addressed: Nothing further to be addressed at this time.   Pharmacist to perform regular reassessments no more than (6) months from the previous assessment.  Care Coordinator to set up future refill outreaches, coordinate prescription delivery, and escalate clinical questions to pharmacist.     Attestation  Therapeutic appropriateness: Appropriate  I attest the patient was actively involved in and has agreed to the above plan of care. If the prescribed therapy is at any point deemed not appropriate based on the current or future assessments, a consultation will be initiated with the patient's specialty care provider to determine the best course of action. The revised plan of therapy will be documented along with any additional patient education provided. Discussed aforementioned material with patient by phone.    Jluis Blunt, [place], [facility]  Clinic Specialty Pharmacist, Neurology  12/6/2024  08:55 EST

## 2025-01-15 ENCOUNTER — SPECIALTY PHARMACY (OUTPATIENT)
Dept: NEUROLOGY | Facility: CLINIC | Age: 40
End: 2025-01-15
Payer: COMMERCIAL

## 2025-01-15 NOTE — PROGRESS NOTES
Specialty Pharmacy Refill Coordination Note     Harinder is a 39 y.o. male contacted today regarding refills of Nurtec and Sumatriptan specialty medication(s).    Reviewed and verified with patient:       Specialty medication(s) and dose(s) confirmed: yes    Refill Questions      Flowsheet Row Most Recent Value   Changes to allergies? No   Changes to medications? No   New conditions or infections since last clinic visit No   Unplanned office visit, urgent care, ED, or hospital admission in the last 4 weeks  No   How does patient/caregiver feel medication is working? Very good   Financial problems or insurance changes  No   Since the previous refill, were any specialty medication doses or scheduled injections missed or delayed?  No   Does this patient require a clinical escalation to a pharmacist? No            Delivery Questions      Flowsheet Row Most Recent Value   Delivery method UPS   Delivery address verified with patient/caregiver? Yes   Delivery address Home   Number of medications in delivery 2   Medication(s) being filled and delivered Rimegepant Sulfate (Nurtec), SUMAtriptan Succinate (IMITREX)   Doses left of specialty medications couple of both   Copay verified? Yes   Copay amount $0   Copay form of payment No copayment ($0)   Ship Date 1/16   Delivery Date 1/17   Signature Required No                   Follow-up: 21 day(s)     Anyi Watson, Pharmacy Technician  Specialty Pharmacy Technician

## 2025-01-24 ENCOUNTER — LAB (OUTPATIENT)
Dept: LAB | Facility: HOSPITAL | Age: 40
End: 2025-01-24
Payer: COMMERCIAL

## 2025-01-24 ENCOUNTER — OFFICE VISIT (OUTPATIENT)
Dept: NEUROLOGY | Facility: CLINIC | Age: 40
End: 2025-01-24
Payer: COMMERCIAL

## 2025-01-24 VITALS
HEART RATE: 96 BPM | WEIGHT: 176 LBS | OXYGEN SATURATION: 97 % | HEIGHT: 65 IN | BODY MASS INDEX: 29.32 KG/M2 | SYSTOLIC BLOOD PRESSURE: 124 MMHG | DIASTOLIC BLOOD PRESSURE: 88 MMHG

## 2025-01-24 DIAGNOSIS — G43.001 MIGRAINE WITHOUT AURA AND WITH STATUS MIGRAINOSUS, NOT INTRACTABLE: Primary | ICD-10-CM

## 2025-01-24 LAB
ALBUMIN SERPL-MCNC: 4.4 G/DL (ref 3.5–5.2)
ALBUMIN/GLOB SERPL: 1.4 G/DL
ALP SERPL-CCNC: 62 U/L (ref 39–117)
ALT SERPL W P-5'-P-CCNC: 29 U/L (ref 1–41)
ANION GAP SERPL CALCULATED.3IONS-SCNC: 7.6 MMOL/L (ref 5–15)
AST SERPL-CCNC: 24 U/L (ref 1–40)
BILIRUB SERPL-MCNC: 0.3 MG/DL (ref 0–1.2)
BUN SERPL-MCNC: 12 MG/DL (ref 6–20)
BUN/CREAT SERPL: 12.8 (ref 7–25)
CALCIUM SPEC-SCNC: 9.3 MG/DL (ref 8.6–10.5)
CHLORIDE SERPL-SCNC: 103 MMOL/L (ref 98–107)
CO2 SERPL-SCNC: 29.4 MMOL/L (ref 22–29)
CREAT SERPL-MCNC: 0.94 MG/DL (ref 0.76–1.27)
EGFRCR SERPLBLD CKD-EPI 2021: 105.8 ML/MIN/1.73
GLOBULIN UR ELPH-MCNC: 3.2 GM/DL
GLUCOSE SERPL-MCNC: 96 MG/DL (ref 65–99)
POTASSIUM SERPL-SCNC: 4.2 MMOL/L (ref 3.5–5.2)
PROT SERPL-MCNC: 7.6 G/DL (ref 6–8.5)
SODIUM SERPL-SCNC: 140 MMOL/L (ref 136–145)

## 2025-01-24 PROCEDURE — 36415 COLL VENOUS BLD VENIPUNCTURE: CPT | Performed by: PSYCHIATRY & NEUROLOGY

## 2025-01-24 PROCEDURE — 80053 COMPREHEN METABOLIC PANEL: CPT | Performed by: PSYCHIATRY & NEUROLOGY

## 2025-01-24 PROCEDURE — 99213 OFFICE O/P EST LOW 20 MIN: CPT | Performed by: PSYCHIATRY & NEUROLOGY

## 2025-01-24 NOTE — PROGRESS NOTES
Chief Complaint  Migraine (December was bad, January has been better)    Subjective          Harinder Dale presents to [facility] for   HISTORY OF PRESENT ILLNESS:    Harinder Dale is a 39 year old right handed man who returns to neurology clinic for follow up evaluation and treatment of migraines.  He has had migraines since he was [age] years old.  The headaches are mostly in the front of his head with a throbbing quality which he rates as 10/10 on pain scale 1-10 when most severe with associated light and sound sensitivity along with nausea and vomiting.  Headaches can last 12 hours up to 6 days in duration.  He had the longest migraine in March 2022 for which he went to the ED and was prescribed sumatriptan 50 mg which he used and this got rid of his migraine.  He has completely cut out caffeine and Excedrin migraine.  There is family history of migraines in his sister.  He had a head CT scan done in 3/2015 which looks normal.  He also had more recent CTA of his head and neck performed on 8/15/2024 which were both normal.  Stress and gluten tends to be known triggers for him.  Laying down in a cold and dark room tends to help with his migraines.  He denies any history of kidney stones or heart palpitations.  He does not have any auras with his migraines currently.  He was doing better with taking the Nurtec ODT every other day for migraine prevention and acute treatment.  He has used sumatriptan as needed rarely as well for breakthrough migraines which does help, rizatriptan was not helpful.   He had a brain MRI scan on 2/1/2024 which was normal.  He did have some pulsatile tinnitus for which he was evaluated by ENT and no definitive abnormalities were noted and again more recent CTA of his head and neck which are normal as well which I informed him today.  He had lab work in 5/2024 which demonstrated normal kidney function and mildly elevated ALT (44) for which he reports  "cutting back on Tylenol more recently and I will recheck a CMP for him today.  He is doing better with Nurtec ODT and has not had any issues.  He was having more headaches in December but tells me he has been doing much better in January.     Past Medical History:   Diagnosis Date    Difficulty walking     Migraine         Family History   Problem Relation Age of Onset    Migraines Sister         Social History     Socioeconomic History    Marital status: Single   Tobacco Use    Smoking status: Former     Current packs/day: 0.00     Types: Cigarettes     Quit date: 3/2/2016     Years since quittin.9    Smokeless tobacco: Never   Vaping Use    Vaping status: Never Used   Substance and Sexual Activity    Alcohol use: Not Currently    Sexual activity: Defer        I have reviewed and confirmed the accuracy of the ROS as documented by the MA/LPN/RN Dominic Aponte MD   Review of Systems   Neurological:  Positive for headache. Negative for dizziness, tremors, seizures, syncope, facial asymmetry, speech difficulty, weakness, light-headedness, numbness, memory problem and confusion.   Psychiatric/Behavioral:  Negative for agitation, behavioral problems, decreased concentration, dysphoric mood, hallucinations, self-injury, sleep disturbance, suicidal ideas, negative for hyperactivity, depressed mood and stress. The patient is not nervous/anxious.         Objective   Vital Signs:   /88   Pulse 96   Ht 164.6 cm (64.8\")   Wt 79.8 kg (176 lb)   SpO2 97%   BMI 29.47 kg/m²       PHYSICAL EXAM:    General   Mental Status - Alert. General Appearance - Well developed, Well groomed, Oriented and Cooperative. Orientation - Oriented X3.       Head and Neck  Head - normocephalic, atraumatic with no lesions or palpable masses.  Neck    Global Assessment - supple.       Eye   Sclera/Conjunctiva - Bilateral - Normal.    ENMT  Mouth and Throat   Oral Cavity/Oropharynx: Oropharynx - the soft palate,uvula and tongue are normal " in appearance.    Chest and Lung Exam   Chest - lung clear to auscultation bilaterally.    Cardiovascular   Cardiovascular examination reveals  - normal heart sounds, regular rate and rhythm.    Neurologic   Mental Status: Speech - Normal. Cognitive function - appropriate fund of knowledge. No impairment of attention, Impairment of concentration, impairment of long term memory or impairment of short term memory.  Cranial Nerves:   II Optic: Visual acuity - Left - Normal. Right - Normal. Visual fields - Normal (to confrontation).  III Oculomotor: Pupillary constriction - Left - Normal. Right - Normal.  VII Facial: - Normal Bilaterally.   IX Glossopharyngeal / X Vagus - Normal.  XI Accessory: Trapezius - Bilateral - Normal. Sternocleidomastoid - Bilateral - Normal.  XII Hypoglossal - Bilateral - Normal.  Eye Movements: - Normal Bilaterally.  Sensory:   Light Touch: Intact - Globally.  Motor:   Bulk and Contour: - Normal.  Tone: - Normal.  Tremor: Not present.  Strength: 5/5 normal muscle strength - All Muscles.                                                        General Assessment of Reflexes: - deep tendon reflexes are normal. Coordination - No Impairment of finger-to-nose or Impairment of rapid alternating movements. Gait - Broad based, right knee is fused.     Result Review :                 Assessment and Plan    Problem List Items Addressed This Visit       Migraine without aura and with status migrainosus, not intractable - Primary    Current Assessment & Plan     39 year old right handed man who returns to neurology clinic for follow up evaluation and treatment of migraines.  He has had migraines since he was 4 or 5 years old.  The headaches are mostly in the front of his head with a throbbing quality which he rates as 10/10 on pain scale 1-10 when most severe with associated light and sound sensitivity along with nausea and vomiting.  Headaches can last 12 hours up to 6 days in duration.  He had the longest  migraine in March 2022 for which he went to the ED and was prescribed sumatriptan 50 mg which he used and this got rid of his migraine.  He has completely cut out caffeine and Excedrin migraine.  There is family history of migraines in his sister.  He had a head CT scan done in 3/2015 which looks normal.  He also had more recent CTA of his head and neck performed on 8/15/2024 which were both normal.  Stress and gluten tends to be known triggers for him.  Laying down in a cold and dark room tends to help with his migraines.  He denies any history of kidney stones or heart palpitations.  He does not have any auras with his migraines currently.  He was doing better with taking the Nurtec ODT every other day for migraine prevention and acute treatment.  He has used sumatriptan as needed rarely as well for breakthrough migraines which does help, rizatriptan was not helpful.   He had a brain MRI scan on 2/1/2024 which was normal.  He did have some pulsatile tinnitus for which he was evaluated by ENT and no definitive abnormalities were noted and again more recent CTA of his head and neck which are normal as well which I informed him today.  He had lab work in 5/2024 which demonstrated normal kidney function and mildly elevated ALT (44) for which he reports cutting back on Tylenol more recently and I will recheck a CMP for him today.  He is doing better with Nurtec ODT and has not had any issues.  He was having more headaches in December but tells me he has been doing much better in January.  I will continue the Nurtec ODT every other day for prevention and acutely as needed.  Will check a CMP as well.  Will follow up in 3 months.             Relevant Medications    rimegepant sulfate (Nurtec) 75 MG tablet    Other Relevant Orders    Comprehensive Metabolic Panel       Follow Up   Return in about 3 months (around 4/24/2025).  Patient was given instructions and counseling regarding his condition or for health maintenance  advice. Please see specific information pulled into the AVS if appropriate.

## 2025-01-24 NOTE — ASSESSMENT & PLAN NOTE
39 year old right handed man who returns to neurology clinic for follow up evaluation and treatment of migraines.  He has had migraines since he was 4 or 5 years old.  The headaches are mostly in the front of his head with a throbbing quality which he rates as 10/10 on pain scale 1-10 when most severe with associated light and sound sensitivity along with nausea and vomiting.  Headaches can last 12 hours up to 6 days in duration.  He had the longest migraine in March 2022 for which he went to the ED and was prescribed sumatriptan 50 mg which he used and this got rid of his migraine.  He has completely cut out caffeine and Excedrin migraine.  There is family history of migraines in his sister.  He had a head CT scan done in 3/2015 which looks normal.  He also had more recent CTA of his head and neck performed on 8/15/2024 which were both normal.  Stress and gluten tends to be known triggers for him.  Laying down in a cold and dark room tends to help with his migraines.  He denies any history of kidney stones or heart palpitations.  He does not have any auras with his migraines currently.  He was doing better with taking the Nurtec ODT every other day for migraine prevention and acute treatment.  He has used sumatriptan as needed rarely as well for breakthrough migraines which does help, rizatriptan was not helpful.   He had a brain MRI scan on 2/1/2024 which was normal.  He did have some pulsatile tinnitus for which he was evaluated by ENT and no definitive abnormalities were noted and again more recent CTA of his head and neck which are normal as well which I informed him today.  He had lab work in 5/2024 which demonstrated normal kidney function and mildly elevated ALT (44) for which he reports cutting back on Tylenol more recently and I will recheck a CMP for him today.  He is doing better with Nurtec ODT and has not had any issues.  He was having more headaches in December but tells me he has been doing much  better in January.  I will continue the Nurtec ODT every other day for prevention and acutely as needed.  Will check a CMP as well.  Will follow up in 3 months.

## 2025-02-11 ENCOUNTER — SPECIALTY PHARMACY (OUTPATIENT)
Dept: NEUROLOGY | Facility: CLINIC | Age: 40
End: 2025-02-11
Payer: COMMERCIAL

## 2025-02-11 RX ORDER — PROPRANOLOL HYDROCHLORIDE 10 MG/1
10 TABLET ORAL 2 TIMES DAILY
Qty: 60 TABLET | Refills: 2 | Status: SHIPPED | OUTPATIENT
Start: 2025-02-11

## 2025-02-24 ENCOUNTER — SPECIALTY PHARMACY (OUTPATIENT)
Dept: NEUROLOGY | Facility: CLINIC | Age: 40
End: 2025-02-24
Payer: COMMERCIAL

## 2025-03-10 ENCOUNTER — SPECIALTY PHARMACY (OUTPATIENT)
Dept: NEUROLOGY | Facility: CLINIC | Age: 40
End: 2025-03-10
Payer: COMMERCIAL

## 2025-03-10 DIAGNOSIS — G43.001 MIGRAINE WITHOUT AURA AND WITH STATUS MIGRAINOSUS, NOT INTRACTABLE: ICD-10-CM

## 2025-03-10 NOTE — PROGRESS NOTES
Specialty Pharmacy Patient Management Program  Neurology Initial Assessment     Harinder Dale is a 39 y.o. male with chronic migraine seen by a Neurology provider and enrolled in the Neurology Patient Management program offered by [facility].  An initial outreach was conducted, including assessment of therapy appropriateness and specialty medication education for rimegepant (Nurtec). The patient was introduced to services offered by [facility], including: regular assessments, refill coordination, curbside pick-up or mail order delivery options, prior authorization maintenance, and financial assistance programs as applicable. The patient was also provided with contact information for the pharmacy team.     Insurance Coverage & Financial Support  Rx [employer] [employer] and copay card.    Relevant Past Medical History and Comorbidities  Relevant medical history and concomitant health conditions were discussed with the patient. The patient's chart has been reviewed for relevant past medical history and comorbid health conditions and updated as necessary.   Past Medical History:   Diagnosis Date    Difficulty walking     Migraine      Social History     Socioeconomic History    Marital status: Single   Tobacco Use    Smoking status: Former     Current packs/day: 0.00     Types: Cigarettes     Quit date: 3/2/2016     Years since quittin.0    Smokeless tobacco: Never   Vaping Use    Vaping status: Never Used   Substance and Sexual Activity    Alcohol use: Not Currently    Sexual activity: Defer     Problem list reviewed by Jluis Blunt RPH on 3/10/2025 at  2:26 PM      Allergies  Known allergies and reactions were discussed with the patient. The patient's chart has been reviewed for  allergy information and updated as necessary.   No Known Allergies  Allergies reviewed by Jluis Blunt RPH on 3/10/2025 at  2:26 PM      Relevant Laboratory  Values  Common labs          5/22/2024    08:05 1/24/2025    14:23   Common Labs   Glucose 87  96    BUN 14  12    Creatinine 1.10  0.94    Sodium 139  140    Potassium 4.7  4.2    Chloride 101  103    Calcium 9.7  9.3    Albumin 4.6  4.4    Total Bilirubin 0.6  0.3    Alkaline Phosphatase 66  62    AST (SGOT) 34  24    ALT (SGPT) 44  29    WBC 4.49     Hemoglobin 16.5     Hematocrit 50.2     Platelets 216     Total Cholesterol 224     Triglycerides 101     HDL Cholesterol 53     LDL Cholesterol  153         Lab Assessment  The above labs have been reviewed. No dose adjustments are needed for the specialty medication(s) based on the labs.       Current Medication List  This medication list has been reviewed with the patient and evaluated for any interactions or necessary modifications/recommendations, and updated to include all prescription medications, OTC medications, and supplements the patient is currently taking.  This list reflects what is contained in the patient's profile, which has also been marked as reviewed to communicate to other providers it is the most up to date version of the patient's current medication therapy.     Current Outpatient Medications:     rimegepant sulfate ODT (Nurtec) 75 MG disintegrating tablet, Place 1 tablet under the tongue Every Other Day., Disp: 16 tablet, Rfl: 2    ibuprofen (ADVIL,MOTRIN) 400 MG tablet, Take 1 tablet by mouth., Disp: , Rfl:     SUMAtriptan (Imitrex) 50 MG tablet, Take 1 tablet by mouth 1 (One) Time As Needed at onset of Migraine. May repeat dose one time in 2 hours if headache not relieved., Disp: 9 tablet, Rfl: 5    Medicines reviewed by Jluis Blunt [facility] on 3/10/2025 at  2:26 PM    Drug Interactions  None.       Initial Education Provided for Specialty Medication  The patient has been provided with the following education and any applicable administration techniques (i.e. self-injection) have been demonstrated for the therapies indicated. All questions  and concerns have been addressed prior to the patient receiving the medication, and the patient has verbalized understanding of the education and any materials provided.  Additional patient education shall be provided and documented upon request by the patient, provider or payer.      Nurtec (rimegepant) 75 mg ODT, 1 tablet by mouth every other day and as needed for migraines.  Medication Expectations   Why am I taking this medication? You are taking this medication for migraine prophylaxis.   What should I expect while on this medication? You should expect to see a decrease in the frequency and severity of your migraines.   How does the medication work? Nurtec is a small molecule that binds to calcitonin gene-related peptide (CGRP) and blocks its binding to the receptor decreasing the severity of migraines.   How long will I be on this medication for? The amount of time you will be on this medication will be determined by your doctor and your response to the medication.    How do I take this medication? Take as directed on your prescription label.   What are some possible side effects? Potential side effects including, but not limited to nausea. Patient verbalized understanding.   What happens if I miss a dose? Take the missed dose as soon as possible, and resume the every other day timed from the last dose.     Medication Safety   What are things I should warn my doctor immediately about? Hypersensitivity reactions - trouble breathing or swallowing.   What are things that I should be cautious of? Hypersensitivity reactions (eg, dyspnea, rash), including delayed serious reactions, have occurred; discontinue use if suspected    What are some medications that can interact with this one? Avoid concomitant administration of Nurtec ODT with strong inhibitors of CY, strong or moderate inducers of CYP3A or inhibitors of P-gp or BCRP. Avoid another dose of Nurtec ODT within 48 hours when it is administered with  moderate inhibitors of CY. Ask your pharmacist or health care provider before starting new medications     Medication Storage/Handling   How should I handle this medication? Keep this medication out of reach of pets/children in original container. Ensure hands are dry before opening blister pack.   How does this medication need to be stored? Store at room temperature away from heat/cold, sunlight or moisture   How should I dispose of this medication? There should not be a need to dispose of this medication unless your provider decides to change the dose or therapy. If that is the case, take to your local police station for proper disposal. Some pharmacies also have take-back bins for medication drop-off.      Resources/Support   How can I remind myself to take this medication? You can download reminder apps to help you manage your refills. You may also set an alarm on your phone to remind you. The pharmacy carries pill boxes that you can place next to an area you pass everyday (such as where you place your car keys or where you charge your phone)   Is financial support available?  Yes, [facility] can provide co-pay cards if you have commercial insurance or patient assistance if you have Medicare or no insurance.    Which vaccines are recommended for me? Talk to your doctor about these vaccines: Flu, Coronavirus (COVID-19), Pneumococcal (pneumonia), Tdap, Hepatitis B, Zoster (shingles)          Adherence and Self-Administration  Adherence related to the patient's specialty therapy was discussed with the patient. The Adherence segment of this outreach has been reviewed and updated.   Is there a concern with patient's ability to self administer the medication correctly and without issue?: No  Were any potential barriers to adherence identified during the initial assessment or patient education?: No  Are there any concerns regarding the patient's understanding of the importance of medication  adherence?: No  Methods for Supporting Patient Adherence and/or Self-Administration: no further support needed at this time.      Goals of Therapy  Goals related to the patient's specialty therapy were discussed with the patient. The Patient Goals segment of this outreach has been reviewed and updated.   Goals Addressed Today        Specialty Pharmacy General Goal      Reduce severity and frequency of migraines and headaches. Starting rimegepant as prevention and acute treatment per Dr. Aponte 12/27/23. Patient rates migraines as a 10/10 on a pain scale from 1-10 when most severe. Headaches can last 12 hours up to 6 days in duration. Headache frequency has been highly variable recently but has had daily headache symptoms for the last 10 days.      3/10/25: Restarting initial clinical assessment and education for rimegepant being used for prevention and acute migraine treatment. Insurance denied quantity of 16 tablets for 30 or 32 days, and patient had switched to propranolol and then to topiramate for migraines. Patient failed both due to significant dizziness and lack of efficacy, respectively, and is now restarting rimegepant.  12/6/24: Patient states rimegepant continues to work very well and with about ~8-10 headache days per month but highly variable month to month. He reports he's been sick the last week and has had some continuous headache symptoms with this but attributes it entirely to illness. Migraine severity reduction is about ~25-50% per patient.   6/18/24: Patient with about ~10-15 headaches per month on rimegepant dosed every other day, which patient states is a significant decrease. Mild reduction in migraine severity of maybe ~25%. Patient content with therapy, no side effects or concerns at this time.                  Reassessment Plan & Follow-Up  Medication Therapy Changes: Restarting rimegepant (Nurtec) every other day and as needed for prevention and acute migraine treatment.  Related Plans,  Therapy Recommendations, or Therapy Problems to Be Addressed: Nothing further to be addressed at this time.   Pharmacist to perform regular reassessments no more than (6) months from the previous assessment.  Care Coordinator to set up future refill outreaches, coordinate prescription delivery, and escalate clinical questions to pharmacist.   Welcome information and patient satisfaction survey to be sent by specialty pharmacy team with patient's initial fill.    Attestation  Therapeutic appropriateness: Appropriate   I attest the patient was actively involved in and has agreed to the above plan of care. If the prescribed therapy is at any point deemed not appropriate based on the current or future assessments, a consultation will be initiated with the patient's specialty care provider to determine the best course of action. The revised plan of therapy will be documented along with any additional patient education provided. Discussed aforementioned material with patient by phone.    Jluis Blunt RPH  3/10/2025  14:28 EDT

## 2025-04-08 ENCOUNTER — SPECIALTY PHARMACY (OUTPATIENT)
Dept: NEUROLOGY | Facility: CLINIC | Age: 40
End: 2025-04-08
Payer: COMMERCIAL

## 2025-04-08 NOTE — PROGRESS NOTES
Specialty Pharmacy Refill Coordination Note     Harinder is a 39 y.o. male contacted today regarding refills of his specialty medication(s).    Specialty medication(s) and dose(s) confirmed: rimegepant 75 mg by mouth every other day  Changes to medications: no  Changes to insurance: no      Refill Questions      Flowsheet Row Most Recent Value   Changes to allergies? No   Changes to medications? No   New conditions or infections since last clinic visit No   Unplanned office visit, urgent care, ED, or hospital admission in the last 4 weeks  No   How does patient/caregiver feel medication is working? Very good   Financial problems or insurance changes  No   Since the previous refill, were any specialty medication doses or scheduled injections missed or delayed?  No   Does this patient require a clinical escalation to a pharmacist? No            Delivery Questions      Flowsheet Row Most Recent Value   Delivery method UPS   Delivery address verified with patient/caregiver? Yes   Delivery address Home   Other address preferred N/A   Number of medications in delivery 2   Medication(s) being filled and delivered SUMAtriptan Succinate (IMITREX), Rimegepant Sulfate (NURTEC-ODT)   Doses left of specialty medications 3-4   Copay verified? Yes   Copay amount $0   Copay form of payment No copayment ($0)   Delivery Date Selection 04/10/25   Signature Required No                 Follow-up: 3 weeks     Jluis Blunt RPH  4/8/2025   15:43 EDT

## 2025-04-28 ENCOUNTER — OFFICE VISIT (OUTPATIENT)
Dept: NEUROLOGY | Facility: CLINIC | Age: 40
End: 2025-04-28
Payer: COMMERCIAL

## 2025-04-28 VITALS
HEART RATE: 76 BPM | OXYGEN SATURATION: 97 % | SYSTOLIC BLOOD PRESSURE: 122 MMHG | BODY MASS INDEX: 30.16 KG/M2 | DIASTOLIC BLOOD PRESSURE: 68 MMHG | WEIGHT: 181 LBS | HEIGHT: 65 IN

## 2025-04-28 DIAGNOSIS — G43.001 MIGRAINE WITHOUT AURA AND WITH STATUS MIGRAINOSUS, NOT INTRACTABLE: Primary | ICD-10-CM

## 2025-04-28 DIAGNOSIS — H93.A3 PULSATILE TINNITUS OF BOTH EARS: ICD-10-CM

## 2025-04-28 PROCEDURE — 99213 OFFICE O/P EST LOW 20 MIN: CPT | Performed by: PSYCHIATRY & NEUROLOGY

## 2025-04-28 NOTE — ASSESSMENT & PLAN NOTE
He did have some pulsatile tinnitus for which he was evaluated by ENT and no definitive abnormalities were noted and again more recent CTA of his head and neck which are normal, he has seen a dentist and is going to try a bite guard to see if this helps.

## 2025-04-28 NOTE — PROGRESS NOTES
Chief Complaint  Migraine doing well    Subjective          Harinder Dale presents to [facility] for   HISTORY OF PRESENT ILLNESS:    Harinder Dale is a 39 year old right handed man who returns to neurology clinic for follow up evaluation and treatment of migraines which have improved with Nurtec ODT every other day and acutely as needed.  He has had migraines since he was 4 or 5 years old.  The headaches are mostly in the front of his head with a throbbing quality which he rates as 10/10 on pain scale 1-10 when most severe with associated light and sound sensitivity along with nausea and vomiting.  Headaches can last 12 hours up to 6 days in duration.  He had the longest migraine in March 2022 for which he went to the ED and was prescribed sumatriptan 50 mg which he used and this got rid of his migraine.  He has completely cut out caffeine and Excedrin migraine.  There is family history of migraines in his sister.  He had a head CT scan done in 3/2015 which looks normal.  He also had more recent CTA of his head and neck performed on 8/15/2024 which were both normal.  Stress and gluten tends to be known triggers for him.  Laying down in a cold and dark room tends to help with his migraines.  He denies any history of kidney stones or heart palpitations.  He does not have any auras with his migraines currently.  He is doing better with taking the Nurtec ODT every other day for migraine prevention and acute treatment and has not had one in 3 weeks.  He has used sumatriptan as needed rarely as well for breakthrough migraines which does help, rizatriptan was not helpful.   He had a brain MRI scan on 2/1/2024 which was normal.  He did have some pulsatile tinnitus for which he was evaluated by ENT and no definitive abnormalities were noted and again more recent CTA of his head and neck which are normal, he has seen a dentist and is going to try a bite guard to see if this helps.  He denies  "it being worse with bending down or positional.  He had more recent lab work in 2025 with CMP which demonstrated normal kidney function and liver function since he cut back on the Tylenol.  He is doing better with Nurtec ODT and has not had any issues.     Past Medical History:   Diagnosis Date    Difficulty walking     Migraine         Family History   Problem Relation Age of Onset    Migraines Sister         Social History     Socioeconomic History    Marital status: Single   Tobacco Use    Smoking status: Former     Current packs/day: 0.00     Types: Cigarettes     Quit date: 3/2/2016     Years since quittin.1    Smokeless tobacco: Never   Vaping Use    Vaping status: Never Used   Substance and Sexual Activity    Alcohol use: Not Currently    Sexual activity: Defer        I have reviewed and confirmed the accuracy of the ROS as documented by the MA/LPN/RN Dominic Aponte MD   Review of Systems   Neurological:  Positive for headache (better). Negative for dizziness, tremors, seizures, syncope, facial asymmetry, speech difficulty, weakness, light-headedness, numbness, memory problem and confusion.        Objective   Vital Signs:   /68   Pulse 76   Ht 164.6 cm (64.8\")   Wt 82.1 kg (181 lb)   SpO2 97%   BMI 30.31 kg/m²       PHYSICAL EXAM:    General   Mental Status - Alert. General Appearance - Well developed, Well groomed, Oriented and Cooperative. Orientation - Oriented X3.       Head and Neck  Head - normocephalic, atraumatic with no lesions or palpable masses.  Neck    Global Assessment - supple.       Eye   Sclera/Conjunctiva - Bilateral - Normal.    ENMT  Mouth and Throat   Oral Cavity/Oropharynx: Oropharynx - the soft palate,uvula and tongue are normal in appearance.    Chest and Lung Exam   Chest - lung clear to auscultation bilaterally.    Cardiovascular   Cardiovascular examination reveals  - normal heart sounds, regular rate and rhythm.    Neurologic   Mental Status: Speech - Normal. " Cognitive function - appropriate fund of knowledge. No impairment of attention, Impairment of concentration, impairment of long term memory or impairment of short term memory.  Cranial Nerves:   II Optic: Visual acuity - Left - Normal. Right - Normal. Visual fields - Normal (to confrontation).  III Oculomotor: Pupillary constriction - Left - Normal. Right - Normal.  VII Facial: - Normal Bilaterally.   IX Glossopharyngeal / X Vagus - Normal.  XI Accessory: Trapezius - Bilateral - Normal. Sternocleidomastoid - Bilateral - Normal.  XII Hypoglossal - Bilateral - Normal.  Eye Movements: - Normal Bilaterally.  Sensory:   Light Touch: Intact - Globally.  Motor:   Bulk and Contour: - Normal.  Tone: - Normal.  Tremor: Not present.  Strength: 5/5 normal muscle strength - All Muscles.                                                        General Assessment of Reflexes: - deep tendon reflexes are normal. Coordination - No Impairment of finger-to-nose or Impairment of rapid alternating movements. Gait - Broad based, right knee is fused.        Result Review :                 Assessment and Plan    Problem List Items Addressed This Visit       Migraine without aura and with status migrainosus, not intractable - Primary    Current Assessment & Plan   39 year old right handed man with migraines doing well with Nurtec ODT.  He has had migraines since he was 4 or 5 years old.  The headaches are mostly in the front of his head with a throbbing quality which he rates as 10/10 on pain scale 1-10 when most severe with associated light and sound sensitivity along with nausea and vomiting.  Headaches can last 12 hours up to 6 days in duration.  He had the longest migraine in March 2022 for which he went to the ED and was prescribed sumatriptan 50 mg which he used and this got rid of his migraine.  He has completely cut out caffeine and Excedrin migraine.  There is family history of migraines in his sister.  He had a head CT scan done in  3/2015 which looks normal.  He also had more recent CTA of his head and neck performed on 8/15/2024 which were both normal.  Stress and gluten tends to be known triggers for him.  Laying down in a cold and dark room tends to help with his migraines.  He denies any history of kidney stones or heart palpitations.  He does not have any auras with his migraines currently.  He is doing better with taking the Nurtec ODT every other day for migraine prevention and acute treatment and has not had one in 3 weeks.  He has used sumatriptan as needed rarely as well for breakthrough migraines which does help, rizatriptan was not helpful.   He had a brain MRI scan on 2/1/2024 which was normal.  He did have some pulsatile tinnitus for which he was evaluated by ENT and no definitive abnormalities were noted and again more recent CTA of his head and neck which are normal, he has seen a dentist and is going to try a bite guard to see if this helps.  He denies it being worse with bending down or positional.  He had more recent lab work in 1/24/2025 with CMP which demonstrated normal kidney function and liver function since he cut back on the Tylenol.  He is doing better with Nurtec ODT and has not had any issues.  I will continue the current dose of Nurtec ODT for both prevention and acute treatment of his migraines and provided patient education information today.           Relevant Medications    rimegepant sulfate ODT (Nurtec) 75 MG disintegrating tablet    Pulsatile tinnitus of both ears    Current Assessment & Plan   He did have some pulsatile tinnitus for which he was evaluated by ENT and no definitive abnormalities were noted and again more recent CTA of his head and neck which are normal, he has seen a dentist and is going to try a bite guard to see if this helps.            Follow Up   Return in about 3 months (around 7/28/2025).  Patient was given instructions and counseling regarding his condition or for health maintenance  advice. Please see specific information pulled into the AVS if appropriate.

## 2025-04-28 NOTE — ASSESSMENT & PLAN NOTE
39 year old right handed man with migraines doing well with Nurtec ODT.  He has had migraines since he was 4 or 5 years old.  The headaches are mostly in the front of his head with a throbbing quality which he rates as 10/10 on pain scale 1-10 when most severe with associated light and sound sensitivity along with nausea and vomiting.  Headaches can last 12 hours up to 6 days in duration.  He had the longest migraine in March 2022 for which he went to the ED and was prescribed sumatriptan 50 mg which he used and this got rid of his migraine.  He has completely cut out caffeine and Excedrin migraine.  There is family history of migraines in his sister.  He had a head CT scan done in 3/2015 which looks normal.  He also had more recent CTA of his head and neck performed on 8/15/2024 which were both normal.  Stress and gluten tends to be known triggers for him.  Laying down in a cold and dark room tends to help with his migraines.  He denies any history of kidney stones or heart palpitations.  He does not have any auras with his migraines currently.  He is doing better with taking the Nurtec ODT every other day for migraine prevention and acute treatment and has not had one in 3 weeks.  He has used sumatriptan as needed rarely as well for breakthrough migraines which does help, rizatriptan was not helpful.   He had a brain MRI scan on 2/1/2024 which was normal.  He did have some pulsatile tinnitus for which he was evaluated by ENT and no definitive abnormalities were noted and again more recent CTA of his head and neck which are normal, he has seen a dentist and is going to try a bite guard to see if this helps.  He denies it being worse with bending down or positional.  He had more recent lab work in 1/24/2025 with CMP which demonstrated normal kidney function and liver function since he cut back on the Tylenol.  He is doing better with Nurtec ODT and has not had any issues.  I will continue the current dose of Nurtec  ODT for both prevention and acute treatment of his migraines and provided patient education information today.

## 2025-05-06 ENCOUNTER — SPECIALTY PHARMACY (OUTPATIENT)
Dept: PHARMACY | Facility: HOSPITAL | Age: 40
End: 2025-05-06
Payer: COMMERCIAL

## 2025-05-06 NOTE — PROGRESS NOTES
Specialty Pharmacy Patient Management Program  Refill Outreach     Harinder was contacted today regarding refills of their medication(s): Nurtec & Sumatriptan    Refill Questions      Flowsheet Row Most Recent Value   Changes to allergies? No   Changes to medications? No   New conditions or infections since last clinic visit No   Unplanned office visit, urgent care, ED, or hospital admission in the last 4 weeks  No   How does patient/caregiver feel medication is working? Very good   Financial problems or insurance changes  No   Since the previous refill, were any specialty medication doses or scheduled injections missed or delayed?  No   Does this patient require a clinical escalation to a pharmacist? No            Delivery Questions      Flowsheet Row Most Recent Value   Delivery method UPS   Delivery address verified with patient/caregiver? Yes   Delivery address Home   Other address preferred N/A   Number of medications in delivery 2   Medication(s) being filled and delivered SUMAtriptan Succinate (IMITREX), Rimegepant Sulfate (NURTEC-ODT)   Doses left of specialty medications Patient has a few   Copay verified? Yes   Copay amount $0   Delivery Date Selection 05/07/25   Signature Required No   Do you consent to receive electronic handouts?  No                 Follow-up: 25 day(s)     Qian Staples, Pharmacy Technician  5/6/2025  10:44 EDT

## 2025-06-06 ENCOUNTER — SPECIALTY PHARMACY (OUTPATIENT)
Dept: NEUROLOGY | Facility: CLINIC | Age: 40
End: 2025-06-06
Payer: COMMERCIAL

## 2025-06-06 NOTE — PROGRESS NOTES
Specialty Pharmacy Patient Management Program  Refill Outreach     Harinder was contacted today regarding refills of their medication(s).    Refill Questions      Flowsheet Row Most Recent Value   Changes to allergies? No   Changes to medications? No   New conditions or infections since last clinic visit No   Unplanned office visit, urgent care, ED, or hospital admission in the last 4 weeks  No   How does patient/caregiver feel medication is working? Very good   Financial problems or insurance changes  No   Since the previous refill, were any specialty medication doses or scheduled injections missed or delayed?  No   Does this patient require a clinical escalation to a pharmacist? No            Delivery Questions      Flowsheet Row Most Recent Value   Delivery method UPS   Delivery address verified with patient/caregiver? Yes   Delivery address Home   Other address preferred N/A   Number of medications in delivery 2   Medication(s) being filled and delivered SUMAtriptan Succinate (IMITREX), Rimegepant Sulfate (NURTEC-ODT)   Doses left of specialty medications Patient has a few   Copay verified? Yes   Copay amount $0   Copay form of payment No copayment ($0)   Delivery Date Selection 06/10/25   Signature Required No   Do you consent to receive electronic handouts?  No                 Follow-up: 25 day(s)     Shadia Rene  6/6/2025  08:30 EDT

## 2025-06-09 ENCOUNTER — SPECIALTY PHARMACY (OUTPATIENT)
Dept: NEUROLOGY | Facility: CLINIC | Age: 40
End: 2025-06-09
Payer: COMMERCIAL

## 2025-06-09 NOTE — PROGRESS NOTES
Specialty Pharmacy Patient Management Program  Refill Outreach     Nurtec PA renewal submitted 6/9/25 key [id number]      Shadia Rene  6/9/2025  14:38 EDT

## 2025-06-09 NOTE — PROGRESS NOTES
Specialty Pharmacy Patient Management Program  Refill Outreach     [facility] approved until 6/9/2026 key [id number]      Shadia Rene  6/9/2025  15:22 EDT

## 2025-06-30 ENCOUNTER — TELEPHONE (OUTPATIENT)
Dept: NEUROLOGY | Facility: CLINIC | Age: 40
End: 2025-06-30

## 2025-06-30 NOTE — TELEPHONE ENCOUNTER
SCHEDULING REQUEST    Caller: Harinder Dale    Relationship to patient: Self    Best call back number: [phone]    Chief complaint: PT REPORTS HE HAS HAD A PERSISTENT HEADACHE FOR THE PAST COUPLE OF WEEKS. HE WOULD LIKE TO KNOW IF HE CAN COME IN FOR A TORADOL INJECTION OR MOVE F/U APPT W/ DR. TORRES TO SOONER DATE?    Type of visit: TORADOL    Requested date: ASAP    PLEASE REVIEW AND ADVISE.

## 2025-07-01 ENCOUNTER — HOSPITAL ENCOUNTER (EMERGENCY)
Facility: HOSPITAL | Age: 40
Discharge: HOME | End: 2025-07-01
Attending: EMERGENCY MEDICINE | Admitting: EMERGENCY MEDICINE
Payer: COMMERCIAL

## 2025-07-01 VITALS
DIASTOLIC BLOOD PRESSURE: 102 MMHG | TEMPERATURE: 97.9 F | WEIGHT: 180 LBS | BODY MASS INDEX: 28.93 KG/M2 | HEIGHT: 66 IN | SYSTOLIC BLOOD PRESSURE: 152 MMHG | HEART RATE: 94 BPM | RESPIRATION RATE: 17 BRPM | OXYGEN SATURATION: 98 %

## 2025-07-01 DIAGNOSIS — R51.9 ACUTE NONINTRACTABLE HEADACHE, UNSPECIFIED HEADACHE TYPE: Primary | ICD-10-CM

## 2025-07-01 PROCEDURE — 96375 TX/PRO/DX INJ NEW DRUG ADDON: CPT

## 2025-07-01 PROCEDURE — 25010000002 KETOROLAC TROMETHAMINE PER 15 MG: Performed by: PHYSICIAN ASSISTANT

## 2025-07-01 PROCEDURE — 96374 THER/PROPH/DIAG INJ IV PUSH: CPT

## 2025-07-01 PROCEDURE — 25810000003 SODIUM CHLORIDE 0.9 % SOLUTION: Performed by: PHYSICIAN ASSISTANT

## 2025-07-01 PROCEDURE — 25010000002 PROCHLORPERAZINE 10 MG/2ML SOLUTION: Performed by: PHYSICIAN ASSISTANT

## 2025-07-01 PROCEDURE — 25010000002 DIPHENHYDRAMINE PER 50 MG: Performed by: PHYSICIAN ASSISTANT

## 2025-07-01 PROCEDURE — 25010000002 DEXAMETHASONE PER 1 MG: Performed by: PHYSICIAN ASSISTANT

## 2025-07-01 PROCEDURE — 99283 EMERGENCY DEPT VISIT LOW MDM: CPT

## 2025-07-01 RX ORDER — PROCHLORPERAZINE EDISYLATE 5 MG/ML
10 INJECTION INTRAMUSCULAR; INTRAVENOUS ONCE
Status: COMPLETED | OUTPATIENT
Start: 2025-07-01 | End: 2025-07-01

## 2025-07-01 RX ORDER — DIPHENHYDRAMINE HYDROCHLORIDE 50 MG/ML
25 INJECTION, SOLUTION INTRAMUSCULAR; INTRAVENOUS ONCE
Status: COMPLETED | OUTPATIENT
Start: 2025-07-01 | End: 2025-07-01

## 2025-07-01 RX ORDER — DEXAMETHASONE SODIUM PHOSPHATE 10 MG/ML
6 INJECTION, SOLUTION INTRA-ARTICULAR; INTRALESIONAL; INTRAMUSCULAR; INTRAVENOUS; SOFT TISSUE ONCE
Status: COMPLETED | OUTPATIENT
Start: 2025-07-01 | End: 2025-07-01

## 2025-07-01 RX ORDER — SODIUM CHLORIDE 0.9 % (FLUSH) 0.9 %
10 SYRINGE (ML) INJECTION AS NEEDED
Status: DISCONTINUED | OUTPATIENT
Start: 2025-07-01 | End: 2025-07-01 | Stop reason: HOSPADM

## 2025-07-01 RX ORDER — KETOROLAC TROMETHAMINE 30 MG/ML
30 INJECTION, SOLUTION INTRAMUSCULAR; INTRAVENOUS ONCE
Status: COMPLETED | OUTPATIENT
Start: 2025-07-01 | End: 2025-07-01

## 2025-07-01 RX ADMIN — DEXAMETHASONE SODIUM PHOSPHATE 6 MG: 10 INJECTION INTRAMUSCULAR; INTRAVENOUS at 12:54

## 2025-07-01 RX ADMIN — KETOROLAC TROMETHAMINE 30 MG: 30 INJECTION INTRAMUSCULAR; INTRAVENOUS at 11:57

## 2025-07-01 RX ADMIN — PROCHLORPERAZINE EDISYLATE 10 MG: 5 INJECTION INTRAMUSCULAR; INTRAVENOUS at 11:59

## 2025-07-01 RX ADMIN — SODIUM CHLORIDE 500 ML: 9 INJECTION, SOLUTION INTRAVENOUS at 11:54

## 2025-07-01 RX ADMIN — DIPHENHYDRAMINE HYDROCHLORIDE 25 MG: 50 INJECTION, SOLUTION INTRAMUSCULAR; INTRAVENOUS at 11:54

## 2025-07-01 NOTE — ED PROVIDER NOTES
EMERGENCY DEPARTMENT ENCOUNTER      PCP: Jaimie Valdes APRN ([facility])  Patient Care Team:  Jaimie Valdes APRN ([facility]) as PCP - General (Family Medicine)   Independent Historians: Patient    HPI:  Chief Complaint: Headache  A complete HPI/ROS/PMH/PSH/SH/FH are unobtainable due to: None    Chronic or social conditions impacting patient care (social determinants of health): None    Context: Harinder Dale is a 40 y.o. male with history of migraine who presents to the ED c/o acute left frontal headache for the past couple of weeks but worse over the past 3 days.  He reports nausea and photophobia.  He takes Imitrex and Nurtec but has not had any relief.  No head injury.  No blood thinners.  He denies sudden maximum intensity onset and states symptoms are similar to previous migraines but have lasted longer and have not been relieved by home meds.  No fevers or chills.  No blood thinners.    Review of prior external notes and/or external test results outside of this encounter: CTA head and neck on 8/15/24 showed minimal bilateral maxillary sinus mucosal thickening. Remainder normal.  MRI brain on 2/1/24 was also normal.      PAST MEDICAL HISTORY  Active Ambulatory Problems     Diagnosis Date Noted    Migraine without aura and with status migrainosus, not intractable 06/28/2022    Pulsatile tinnitus of both ears 07/26/2024     Resolved Ambulatory Problems     Diagnosis Date Noted    No Resolved Ambulatory Problems     Past Medical History:   Diagnosis Date    Difficulty walking     Migraine        The patient has started, but not completed, their COVID-19 vaccination series.    PAST SURGICAL HISTORY  Past Surgical History:   Procedure Laterality Date    KNEE FUSION Right     VASECTOMY           FAMILY HISTORY  Family History   Problem Relation Age of Onset    Migraines Sister          SOCIAL HISTORY  Social History     Socioeconomic History    Marital status: Single   Tobacco Use    Smoking status: Former      Current packs/day: 0.00     Types: Cigarettes     Quit date: 3/2/2016     Years since quittin.3    Smokeless tobacco: Never   Vaping Use    Vaping status: Never Used   Substance and Sexual Activity    Alcohol use: Not Currently    Sexual activity: Defer         ALLERGIES  Patient has no known allergies.        REVIEW OF SYSTEMS  Review of Systems   Constitutional:  Negative for fever.   Eyes:  Positive for photophobia.   Neurological:  Positive for headaches. Negative for syncope.        All systems reviewed and negative except for those discussed in HPI.       PHYSICAL EXAM    I have reviewed the triage vital signs and nursing notes.    ED Triage Vitals   Temp Heart Rate Resp BP SpO2   25 1136 25 1136 25 1136 25 1139 25 1136   97.9 °F (36.6 °C) 85 18 135/96 95 %      Temp src Heart Rate Source Patient Position BP Location FiO2 (%)   25 1136 25 1136 25 1139 25 1139 --   Oral Monitor Sitting Right arm        Physical Exam  GENERAL: alert, patient tearful  SKIN: Warm, dry  HENT: Normocephalic, atraumatic  EYES: no scleral icterus, photophobia, PERRL  CV: regular rhythm, regular rate  RESPIRATORY: normal effort, lungs clear  ABDOMEN: soft, nontender, nondistended  MUSCULOSKELETAL: no deformity  NEURO: alert, moves all extremities, follows commands, no focal deficits          LAB RESULTS  No results found for this or any previous visit (from the past 24 hours).    Ordered the above labs and independently reviewed and interpreted the results.        RADIOLOGY  No Radiology Exams Resulted Within Past 24 Hours    I ordered the above noted radiological studies. Independently reviewed and interpreted by me.  See dictation for official radiology interpretation.      PROCEDURES    Procedures      MEDICATIONS GIVEN IN ER    Medications   sodium chloride 0.9 % flush 10 mL (has no administration in time range)   prochlorperazine (COMPAZINE) injection 10 mg (10 mg  Intravenous Given 7/1/25 1159)   diphenhydrAMINE (BENADRYL) injection 25 mg (25 mg Intravenous Given 7/1/25 1154)   ketorolac (TORADOL) injection 30 mg (30 mg Intravenous Given 7/1/25 1157)   sodium chloride 0.9 % bolus 500 mL (0 mL Intravenous Stopped 7/1/25 1300)   dexAMETHasone (DECADRON) injection 6 mg (6 mg Intravenous Given 7/1/25 1254)         PROGRESS, DATA ANALYSIS, CONSULTS, AND MEDICAL DECISION MAKING    All labs have been independently reviewed and interpreted by me.  All radiology studies have been independently reviewed and interpreted by me and discussed with radiologist dictating the report.   EKG's independently reviewed and interpreted by me.  Discussion below represents my analysis of pertinent findings related to patient's condition, differential diagnosis, treatment plan and final disposition.    Differential diagnosis for headache includes but is not limited to:    Migraine, tension headache, cluster headache, meningitis, ICH, acute obstructive hydrocephalus, intracranial mass, CO poisoning, CVA, cerebral venous thrombosis, HTN emergency, giant cell arteritis, idiopathic intracranial hypertension, acute glaucoma, acute sinusitis, cavernous sinus thrombosis, carotid artery dissection, trigeminal neuralgia, post LP headache, dehydration       Considered CT imaging however patient has history of migraines with no acute neurological deficits.          AS OF 13:01 EDT VITALS:    BP - 135/96  HR - 85  TEMP - 97.9 °F (36.6 °C) (Oral)  O2 SATS - 95%        DIAGNOSIS  Final diagnoses:   Acute nonintractable headache, unspecified headache type         DISPOSITION  ED Disposition       ED Disposition   Discharge    Condition   Stable    Comment   --                  Note Disclaimer: At [facility], we believe that sharing information builds trust and better relationships. You are receiving this note because you recently visited [facility]. It is possible you will see health information before a  provider has talked with you about it. This kind of information can be easy to misunderstand. To help you fully understand what it means for your health, we urge you to discuss this note with your provider.         Renea Sanchez PA  07/01/25 [id number]

## 2025-07-01 NOTE — TELEPHONE ENCOUNTER
Caller: Harinder Dale    Relationship: Self    Best call back number:   Telephone Information:   Mobile [phone]       Who are you requesting to speak with (clinical staff, provider,  specific staff member): CLINICAL    What was the call regarding: PT STATES HE IS STILL EXPERIENCING THE HEADACHE BUT STARTING TO TURN INTO A MIGRAINE AT THIS POINT. PLEASE REVIEW AND ADVISE.

## 2025-07-01 NOTE — ED PROVIDER NOTES
"SHARED VISIT: This visit was performed by BOTH a physician and an APC. The substantive portion of the medical decision making was performed by this attesting physician who made or approved the management plan and takes responsibility for patient management. All studies in the APC note (if performed) were independently interpreted by me.     The SHANTELLE and I have discussed this patient's history, physical exam, and treatment plan.  I have reviewed the documentation and personally had a face to face interaction with the patient. I affirm the documentation and agree with the treatment and plan.  The attached note describes my personal findings.      I provided a substantive portion of the care of the patient.  I personally performed the physical exam in its entirety, and below are my findings.     Brief history of present illness: 40-year-old male with a history of migraines.  Patient presents with prolonged headache but otherwise it is in the same location started the same as his usual migraines.  No fever, vision change, focal numbness or weakness.  Patient does have some photophobia and nausea with this headache    Physical exam:    /96 (BP Location: Right arm, Patient Position: Sitting)   Pulse 85   Temp 97.9 °F (36.6 °C) (Oral)   Resp 18   Ht 167.6 cm (66\")   Wt 81.6 kg (180 lb)   SpO2 95%   BMI 29.05 kg/m²       Physical Exam   Constitutional: No distress.  Nontoxic  HENT:  Head: Normocephalic and atraumatic.   Oropharynx: Mucous membranes are moist.   Eyes: . No scleral icterus.   Neck: Normal range of motion. Neck supple.  No meningismus or rigidity  Cardiovascular: Pink warm and well perfused throughout.    Pulmonary/Chest: No respiratory distress.    Musculoskeletal: Moves all extremities equally.    Neurological: Alert and oriented.  Speech fluent and easily intelligible  Skin: Skin is pink, warm, and dry.   Psychiatric: Mood and affect normal.   Nursing note and vitals reviewed.        MDM:  My " differential diagnosis for headache includes but is not limited to:  Migraine, cluster, ischemic stroke, subarachnoid hemorrhage, intracranial hemorrhage, vascular malformation, cerebral aneurysm, vascular dissection, vasculitis, temporal arteritis, malignant hypertension, pheochromocytoma, cerebral venous thrombosis, preeclampsia; bacterial meningitis, viral meningitis, fungal meningitis, encephalitis, brain abscess, pleural empyema, sinusitis, dental infection, influenza, viral syndrome; carbon monoxide exposure, analgesic abuse, hypoglycemia; trigeminal neuralgia, postherpetic neuralgia, occipital neuralgia; subdural hematoma, concussion, musculoskeletal tension, cervical osteoarthritis; glaucoma, TMJ disease, pseudotumor cerebri, post LP headache, intracranial neoplasm, sleep apnea      Please note that portions of this were completed with a voice recognition program.       Note Disclaimer: At [facility], we believe that sharing information builds trust and better relationships. You are receiving this note because you are receiving care at [facility] or recently visited. It is possible you will see health information before a provider has talked with you about it. This kind of information can be easy to misunderstand. To help you fully understand what it means for your health, we urge you to discuss this note with your provider.     Long Chua MD  07/01/25 6715

## 2025-07-01 NOTE — ED TRIAGE NOTES
Pt c/o headache daily for the last 2wks. Pt states that current headache has lasted 3days. Reports nausea and sensitivity to light and sound. No relief with home meds

## 2025-07-11 ENCOUNTER — SPECIALTY PHARMACY (OUTPATIENT)
Dept: NEUROLOGY | Facility: CLINIC | Age: 40
End: 2025-07-11
Payer: COMMERCIAL

## 2025-07-11 NOTE — PROGRESS NOTES
Specialty Pharmacy Patient Management Program  Refill Outreach     Harinder was contacted today regarding refills of their medication(s).    Refill Questions      Flowsheet Row Most Recent Value   Changes to allergies? No   Changes to medications? No   New conditions or infections since last clinic visit No   Unplanned office visit, urgent care, ED, or hospital admission in the last 4 weeks  No   How does patient/caregiver feel medication is working? Very good   Financial problems or insurance changes  No   Since the previous refill, were any specialty medication doses or scheduled injections missed or delayed?  No   Does this patient require a clinical escalation to a pharmacist? No            Delivery Questions      Flowsheet Row Most Recent Value   Delivery method UPS   Delivery address verified with patient/caregiver? Yes   Delivery address Home   Other address preferred N/A   Number of medications in delivery 1   Medication(s) being filled and delivered Rimegepant Sulfate (NURTEC-ODT)   Doses left of specialty medications 5   Copay verified? Yes   Copay amount $0   Copay form of payment No copayment ($0)   Delivery Date Selection 07/15/25   Signature Required No   Do you consent to receive electronic handouts?  Yes                 Follow-up: 21 day(s)     Anyi Watson, Pharmacy Technician  7/11/2025  09:06 EDT

## 2025-07-29 ENCOUNTER — OFFICE VISIT (OUTPATIENT)
Dept: NEUROLOGY | Facility: CLINIC | Age: 40
End: 2025-07-29
Payer: COMMERCIAL

## 2025-07-29 VITALS
SYSTOLIC BLOOD PRESSURE: 124 MMHG | DIASTOLIC BLOOD PRESSURE: 84 MMHG | WEIGHT: 181 LBS | HEIGHT: 66 IN | OXYGEN SATURATION: 96 % | BODY MASS INDEX: 29.09 KG/M2 | HEART RATE: 66 BPM

## 2025-07-29 DIAGNOSIS — G43.001 MIGRAINE WITHOUT AURA AND WITH STATUS MIGRAINOSUS, NOT INTRACTABLE: Primary | ICD-10-CM

## 2025-07-29 PROBLEM — H93.A3 PULSATILE TINNITUS OF BOTH EARS: Status: RESOLVED | Noted: 2024-07-26 | Resolved: 2025-07-29

## 2025-07-29 PROCEDURE — 99213 OFFICE O/P EST LOW 20 MIN: CPT | Performed by: PSYCHIATRY & NEUROLOGY

## 2025-07-29 NOTE — PROGRESS NOTES
Chief Complaint  Migraine (Had more than 10 last month, hasn't had any this month - stopped taking the Sumatriptan and Nurtec at the end of last month. )    Subjective          Harinder Dale presents to [facility] for   HISTORY OF PRESENT ILLNESS:    Harinder Dale is a 40 year old right handed man who returns to neurology clinic for follow up evaluation and treatment of migraines which had increased over the past couple months up to 15+ headache days last month and he had to be evaluated in the ED on 7/1/2025 as he had experienced ongoing headache for 6 straight days but reports not having any further since stopping the Nurtec ODT every other day.  He has had migraines since he was [age] years old.  The headaches are mostly in the front of his head with a throbbing quality which he rates as 10/10 on pain scale 1-10 when most severe with associated light and sound sensitivity along with nausea and vomiting.  Headaches can last 12 hours up to 6 days in duration.  He had the longest migraine in March 2022 for which he went to the ED and was prescribed sumatriptan 50 mg which he used and this got rid of his migraine.  He has completely cut out caffeine and Excedrin migraine.  There is family history of migraines in his sister.  He had a head CT scan done in 3/2015 which looks normal.  He also had more recent CTA of his head and neck performed on 8/15/2024 which were both normal.  Stress and gluten tends to be known triggers for him.  Laying down in a cold and dark room tends to help with his migraines.  He denies any history of kidney stones or heart palpitations.  He does not have any auras with his migraines currently.  He was doing better with taking the Nurtec ODT every other day for migraine prevention and acute treatment until over the past 2-3 months when he had increase in migraine headaches up to 15+ headache days per month.  He has used sumatriptan as needed which helps more  than the Nurtec ODT for breakthrough migraines which does help, rizatriptan was not helpful.   He had a brain MRI scan on 2024 which was normal.  He did have some pulsatile tinnitus for which he was evaluated by ENT and no definitive abnormalities were noted and again more recent CTA of his head and neck which are normal, he has seen a dentist and is going to try a bite guard to see if this helps bu the has not used this but reports this has gotten better.  He denies it being worse with bending down or positional.  He had ab work in 2025 with CMP which demonstrated normal kidney function and liver function since he cut back on the Tylenol.  He was evaluated in the ED on 2025 and treated with migraine acute medications including Toradol and bendaryl, compazine and steroid and his headache resolved.  He tells me he would like to stop taking medication because once he stopped taking the Nurtec ODT every other day he has not had any further headaches.       Past Medical History:   Diagnosis Date    Cluster headache     Difficulty walking     Headache, tension-type     Migraine         Family History   Problem Relation Age of Onset    Migraines Sister         Social History     Socioeconomic History    Marital status: Single   Tobacco Use    Smoking status: Former     Current packs/day: 0.00     Types: Cigarettes     Quit date: 3/2/2016     Years since quittin.4    Smokeless tobacco: Never   Vaping Use    Vaping status: Never Used   Substance and Sexual Activity    Alcohol use: Not Currently    Sexual activity: Defer        I have reviewed and confirmed the accuracy of the ROS as documented by the MA/LPN/RN Dominic Aponte MD   Review of Systems   Neurological:  Positive for headache. Negative for dizziness, tremors, seizures, syncope, facial asymmetry, speech difficulty, weakness, light-headedness, numbness, memory problem and confusion.   Psychiatric/Behavioral:  Negative for agitation, behavioral  "problems, decreased concentration, dysphoric mood, hallucinations, self-injury, sleep disturbance, suicidal ideas, negative for hyperactivity, depressed mood and stress. The patient is not nervous/anxious.         Objective   Vital Signs:   /84   Pulse 66   Ht 167.6 cm (65.98\")   Wt 82.1 kg (181 lb)   SpO2 96%   BMI 29.23 kg/m²       PHYSICAL EXAM:    General   Mental Status - Alert. General Appearance - Well developed, Well groomed, Oriented and Cooperative. Orientation - Oriented X3.       Head and Neck  Head - normocephalic, atraumatic with no lesions or palpable masses.  Neck    Global Assessment - supple.       Eye   Sclera/Conjunctiva - Bilateral - Normal.    ENMT  Mouth and Throat   Oral Cavity/Oropharynx: Oropharynx - the soft palate,uvula and tongue are normal in appearance.    Chest and Lung Exam   Chest - lung clear to auscultation bilaterally.    Cardiovascular   Cardiovascular examination reveals  - normal heart sounds, regular rate and rhythm.    Neurologic   Mental Status: Speech - Normal. Cognitive function - appropriate fund of knowledge. No impairment of attention, Impairment of concentration, impairment of long term memory or impairment of short term memory.  Cranial Nerves:   II Optic: Visual acuity - Left - Normal. Right - Normal. Visual fields - Normal (to confrontation).  III Oculomotor: Pupillary constriction - Left - Normal. Right - Normal.  VII Facial: - Normal Bilaterally.   IX Glossopharyngeal / X Vagus - Normal.  XI Accessory: Trapezius - Bilateral - Normal. Sternocleidomastoid - Bilateral - Normal.  XII Hypoglossal - Bilateral - Normal.  Eye Movements: - Normal Bilaterally.  Sensory:   Light Touch: Intact - Globally.  Motor:   Bulk and Contour: - Normal.  Tone: - Normal.  Tremor: Not present.  Strength: 5/5 normal muscle strength - All Muscles.                                                        General Assessment of Reflexes: - deep tendon reflexes are normal. " Coordination - No Impairment of finger-to-nose or Impairment of rapid alternating movements. Gait - Broad based, right knee is fused.       Result Review :                 Assessment and Plan    Problem List Items Addressed This Visit       Migraine without aura and with status migrainosus, not intractable - Primary    Current Assessment & Plan   40 year old right handed man with migraines which had increased over the past couple months up to 15+ headache days last month and he had to be evaluated in the ED on 7/1/2025 as he had experienced ongoing headache for 6 straight days but reports not having any further since stopping the Nurtec ODT every other day.  He has had migraines since he was 4 or 5 years old.  The headaches are mostly in the front of his head with a throbbing quality which he rates as 10/10 on pain scale 1-10 when most severe with associated light and sound sensitivity along with nausea and vomiting.  Headaches can last 12 hours up to 6 days in duration.  He had the longest migraine in March 2022 for which he went to the ED and was prescribed sumatriptan 50 mg which he used and this got rid of his migraine.  He has completely cut out caffeine and Excedrin migraine.  There is family history of migraines in his sister.  He had a head CT scan done in 3/2015 which looks normal.  He also had more recent CTA of his head and neck performed on 8/15/2024 which were both normal.  Stress and gluten tends to be known triggers for him.  Laying down in a cold and dark room tends to help with his migraines.  He denies any history of kidney stones or heart palpitations.  He does not have any auras with his migraines currently.  He was doing better with taking the Nurtec ODT every other day for migraine prevention and acute treatment until over the past 2-3 months when he had increase in migraine headaches up to 15+ headache days per month.  He has used sumatriptan as needed which helps more than the Nurtec ODT for  breakthrough migraines which does help, rizatriptan was not helpful.   He had a brain MRI scan on 2/1/2024 which was normal.  He did have some pulsatile tinnitus for which he was evaluated by ENT and no definitive abnormalities were noted and again more recent CTA of his head and neck which are normal, he has seen a dentist and is going to try a bite guard to see if this helps bu the has not used this but reports this has gotten better.  He denies it being worse with bending down or positional.  He had ab work in 1/24/2025 with CMP which demonstrated normal kidney function and liver function since he cut back on the Tylenol.  He was evaluated in the ED on 7/1/2025 and treated with migraine acute medications including Toradol and bendaryl, compazine and steroid and his headache resolved.  He tells me he would like to stop taking medication because once he stopped taking the Nurtec ODT every other day he has not had any further headaches.    I spoke with him about the possibility of rebound headaches related to taking the Nurtec ODT every other day.  He is not interested in trying another medication at this time and would rather just use sumatriptan or Nurtec ODT as needed if he does get a headache.  I discussed migraine triggers and lifestyle modifications.                I spent 22 minutes caring for Harinder on this date of service. This time includes time spent by me in the following activities:preparing for the visit, reviewing tests, obtaining and/or reviewing a separately obtained history, performing a medically appropriate examination and/or evaluation , counseling and educating the patient/family/caregiver, documenting information in the medical record, and care coordination    Follow Up   No follow-ups on file.  Patient was given instructions and counseling regarding his condition or for health maintenance advice. Please see specific information pulled into the AVS if appropriate.

## 2025-07-29 NOTE — ASSESSMENT & PLAN NOTE
40 year old right handed man with migraines which had increased over the past couple months up to 15+ headache days last month and he had to be evaluated in the ED on 7/1/2025 as he had experienced ongoing headache for 6 straight days but reports not having any further since stopping the Nurtec ODT every other day.  He has had migraines since he was 4 or 5 years old.  The headaches are mostly in the front of his head with a throbbing quality which he rates as 10/10 on pain scale 1-10 when most severe with associated light and sound sensitivity along with nausea and vomiting.  Headaches can last 12 hours up to 6 days in duration.  He had the longest migraine in March 2022 for which he went to the ED and was prescribed sumatriptan 50 mg which he used and this got rid of his migraine.  He has completely cut out caffeine and Excedrin migraine.  There is family history of migraines in his sister.  He had a head CT scan done in 3/2015 which looks normal.  He also had more recent CTA of his head and neck performed on 8/15/2024 which were both normal.  Stress and gluten tends to be known triggers for him.  Laying down in a cold and dark room tends to help with his migraines.  He denies any history of kidney stones or heart palpitations.  He does not have any auras with his migraines currently.  He was doing better with taking the Nurtec ODT every other day for migraine prevention and acute treatment until over the past 2-3 months when he had increase in migraine headaches up to 15+ headache days per month.  He has used sumatriptan as needed which helps more than the Nurtec ODT for breakthrough migraines which does help, rizatriptan was not helpful.   He had a brain MRI scan on 2/1/2024 which was normal.  He did have some pulsatile tinnitus for which he was evaluated by ENT and no definitive abnormalities were noted and again more recent CTA of his head and neck which are normal, he has seen a dentist and is going to try a  bite guard to see if this helps bu the has not used this but reports this has gotten better.  He denies it being worse with bending down or positional.  He had ab work in 1/24/2025 with [facility] which demonstrated normal kidney function and liver function since he cut back on the Tylenol.  He was evaluated in the ED on 7/1/2025 and treated with migraine acute medications including Toradol and bendaryl, compazine and steroid and his headache resolved.  He tells me he would like to stop taking medication because once he stopped taking the Nurtec ODT every other day he has not had any further headaches.    I spoke with him about the possibility of rebound headaches related to taking the Nurtec ODT every other day.  He is not interested in trying another medication at this time and would rather just use sumatriptan or Nurtec ODT as needed if he does get a headache.  I discussed migraine triggers and lifestyle modifications.